# Patient Record
Sex: MALE | Race: WHITE | NOT HISPANIC OR LATINO | Employment: PART TIME | URBAN - METROPOLITAN AREA
[De-identification: names, ages, dates, MRNs, and addresses within clinical notes are randomized per-mention and may not be internally consistent; named-entity substitution may affect disease eponyms.]

---

## 2022-09-30 PROCEDURE — 99284 PR EMERGENCY DEPT VISIT,LEVEL IV: ICD-10-PCS | Mod: ,,, | Performed by: EMERGENCY MEDICINE

## 2022-09-30 PROCEDURE — 99284 EMERGENCY DEPT VISIT MOD MDM: CPT | Mod: ,,, | Performed by: EMERGENCY MEDICINE

## 2022-09-30 PROCEDURE — 99284 EMERGENCY DEPT VISIT MOD MDM: CPT

## 2022-10-01 ENCOUNTER — HOSPITAL ENCOUNTER (EMERGENCY)
Facility: HOSPITAL | Age: 42
Discharge: HOME | End: 2022-10-01
Attending: EMERGENCY MEDICINE
Payer: MEDICAID

## 2022-10-01 VITALS
RESPIRATION RATE: 18 BRPM | WEIGHT: 178 LBS | BODY MASS INDEX: 24.92 KG/M2 | TEMPERATURE: 99 F | SYSTOLIC BLOOD PRESSURE: 129 MMHG | DIASTOLIC BLOOD PRESSURE: 77 MMHG | HEIGHT: 71 IN | OXYGEN SATURATION: 99 % | HEART RATE: 76 BPM

## 2022-10-01 DIAGNOSIS — K08.89 PAIN, DENTAL: ICD-10-CM

## 2022-10-01 DIAGNOSIS — K04.7 DENTAL ABSCESS: Primary | ICD-10-CM

## 2022-10-01 PROCEDURE — 25000003 PHARM REV CODE 250: Performed by: EMERGENCY MEDICINE

## 2022-10-01 PROCEDURE — 96372 THER/PROPH/DIAG INJ SC/IM: CPT | Performed by: EMERGENCY MEDICINE

## 2022-10-01 PROCEDURE — 63600175 PHARM REV CODE 636 W HCPCS: Performed by: EMERGENCY MEDICINE

## 2022-10-01 RX ORDER — PENICILLIN V POTASSIUM 500 MG/1
500 TABLET, FILM COATED ORAL EVERY 6 HOURS
Qty: 28 TABLET | Refills: 0 | Status: ON HOLD | OUTPATIENT
Start: 2022-10-01 | End: 2022-11-05 | Stop reason: HOSPADM

## 2022-10-01 RX ORDER — AMOXICILLIN 500 MG/1
500 CAPSULE ORAL
Status: COMPLETED | OUTPATIENT
Start: 2022-10-01 | End: 2022-10-01

## 2022-10-01 RX ORDER — IBUPROFEN 200 MG
400 TABLET ORAL EVERY 6 HOURS PRN
Qty: 30 TABLET | Refills: 0 | Status: ON HOLD | OUTPATIENT
Start: 2022-10-01 | End: 2022-11-05 | Stop reason: HOSPADM

## 2022-10-01 RX ORDER — HYDROCODONE BITARTRATE AND ACETAMINOPHEN 5; 325 MG/1; MG/1
1 TABLET ORAL EVERY 6 HOURS PRN
Qty: 12 TABLET | Refills: 0 | Status: CANCELLED | OUTPATIENT
Start: 2022-10-01 | End: 2022-10-04

## 2022-10-01 RX ORDER — ACETAMINOPHEN 500 MG
1000 TABLET ORAL 3 TIMES DAILY PRN
Qty: 30 TABLET | Refills: 0
Start: 2022-10-01 | End: 2022-10-01 | Stop reason: SDUPTHER

## 2022-10-01 RX ORDER — IBUPROFEN 200 MG
400 TABLET ORAL EVERY 6 HOURS PRN
Qty: 30 TABLET | Refills: 0
Start: 2022-10-01 | End: 2022-10-01 | Stop reason: SDUPTHER

## 2022-10-01 RX ORDER — PENICILLIN V POTASSIUM 500 MG/1
500 TABLET, FILM COATED ORAL EVERY 6 HOURS
Qty: 28 TABLET | Refills: 0 | Status: CANCELLED | OUTPATIENT
Start: 2022-10-01

## 2022-10-01 RX ORDER — ACETAMINOPHEN 500 MG
1000 TABLET ORAL 3 TIMES DAILY PRN
Qty: 30 TABLET | Refills: 0 | Status: ON HOLD | OUTPATIENT
Start: 2022-10-01 | End: 2022-11-05 | Stop reason: SDUPTHER

## 2022-10-01 RX ORDER — KETOROLAC TROMETHAMINE 30 MG/ML
10 INJECTION, SOLUTION INTRAMUSCULAR; INTRAVENOUS
Status: COMPLETED | OUTPATIENT
Start: 2022-10-01 | End: 2022-10-01

## 2022-10-01 RX ORDER — ACETAMINOPHEN 500 MG
1000 TABLET ORAL 3 TIMES DAILY PRN
Qty: 30 TABLET | Refills: 0 | Status: CANCELLED
Start: 2022-10-01

## 2022-10-01 RX ORDER — HYDROCODONE BITARTRATE AND ACETAMINOPHEN 5; 325 MG/1; MG/1
1 TABLET ORAL EVERY 6 HOURS PRN
Qty: 12 TABLET | Refills: 0 | Status: SHIPPED | OUTPATIENT
Start: 2022-10-01 | End: 2022-10-04

## 2022-10-01 RX ORDER — IBUPROFEN 200 MG
400 TABLET ORAL EVERY 6 HOURS PRN
Qty: 30 TABLET | Refills: 0 | Status: CANCELLED
Start: 2022-10-01

## 2022-10-01 RX ADMIN — KETOROLAC TROMETHAMINE 10 MG: 30 INJECTION, SOLUTION INTRAMUSCULAR; INTRAVENOUS at 12:10

## 2022-10-01 RX ADMIN — AMOXICILLIN 500 MG: 500 CAPSULE ORAL at 12:10

## 2022-10-01 NOTE — DISCHARGE INSTRUCTIONS
DENTAL RESOURCES      [facility]       [phone]     [facility] 8-4 Monday - Friday       [phone]     [facility]romised [facility]       [phone]     [facility]                                 0-6 years                                                                                             7-13 years     [phone]     [facility]    [facility]   Developmental Disability [facility]     [phone]     [facility]       [phone]     [facility]    [address] [facility], Mon-Fri not on Wed  8am-4pm    Over 60 years old living in [place]           [phone]     [facility]    [address] [facility] [place]     [phone]     [facility] [address]       [phone]     [facility]  [address] [facility]       [phone]     [facility]       [phone]      [facility]     Ad·     4.5  (35) · Dentist     CLAY Sullivan     Closed ? Opens 8AM Wed · [phone]     WEBSITE     DIRECTIONS     [facility]     Ad·     4.1  (188) · Dentist     CLAY Foote     Open ? Closes 5:30PM · [phone]     WEBSITE     DIRECTIONS     [facility]     4.9  (889) · Dentist     [address]     Open ? Closes 5PM · [phone]     WEBSITE     DIRECTIONS     [facility]     5.0  (17) · Dentist     [address]     Open ? Closes 5PM · [phone]     WEBSITE     DIRECTIONS     [facility]     4.7  (61) · Dental clinic     [address]     Open ? Closes 5PM · [phone]     WEBSITE     DIRECTIONS     [facility]     4.8  (448) · Dental clinic     [address]     Closed ? Opens 8AM Wed · [phone]     WEBSITE     DIRECTIONS     [facility]      4.8  (171) · Dentist     [address]     Open ? Closes 6PM · [phone]     WEBSITE     DIRECTIONS     [facility]     4.9  (312) · Dentist     [address]     Open ? Closes 5PM · [phone]     Online care     WEBSITE     DIRECTIONS     [facility]     4.9  (249) · Dental clinic     [address]     Open ? Closes 5PM · [phone]     WEBSITE     DIRECTIONS     [facility]     4.9  (80) · Dentist     [place]     Open ? Closes 5PM · [phone]     WEBSITE     DIRECTIONS     [facility]     5.0  (88) · Dentist     [address]     Open ? Closes 8PM · [phone]     WEBSITE     DIRECTIONS     [facility]     4.6  (36) · Dentist     [address]     Closed ? Opens 8AM Mon · [phone]     WEBSITE     DIRECTIONS     [facility]     4.8  (107) · Dentist     [address]     Open ? Closes 4PM · [phone]     WEBSITE     DIRECTIONS     [facility]: Marquise Rutledge, DDS     5.0  (75) · Dentist     [address]     Open ? Closes 4PM · [phone]     WEBSITE     DIRECTIONS     [facility]     4.6  (41) · Dentist     [address]     Open ? Closes 5PM · [phone]     WEBSITE     DIRECTIONS     [facility]     4.9  (27) · Dentist     [address]     Open ? Closes 5PM · [phone]     WEBSITE     DIRECTIONS     [facility]     4.9  (368) · Dentist     [place], [place]     Open ? Closes 5PM · [phone]     WEBSITE     DIRECTIONS     [facility]     4.7  (74) · Dentist     [address]     Open ? Closes 5PM · [phone]     WEBSITE     DIRECTIONS     [facility]     4.9  (1,100) · Dental clinic     [address]     Open ? Closes 5PM · [phone]     WEBSITE     DIRECTIONS     [facility]     3.9  (14) · Dental clinic     [place], [place]     Open ? Closes  5PM · [phone]     WEBSITE     DIRECTIONS     Dr. Mounika Marrero DDS     No reviews · Dentist     [address]     Open ? Closes 5PM · [phone]     WEBSITE     DIRECTIONS     [facility]     4.6  (47) · Dentist     CLAY Sullivan     Temporarily closed · [phone]

## 2022-10-01 NOTE — ED NOTES
"Pt screaming and crying en route to intake 02. Pt sitting in exam room table rocking back and forth stating " My wisdom tooth is decaying in the back. It's sharp pains coming on. It's excruciating. I have never felt this type of pain." Pt reports pain to L side of mouth. Pt rates pain 10/10 on numerical pain scale. Airway patent. Pt denies difficulty swallowing/ shortness of breath or swelling to throat/ mouth.   "

## 2022-10-01 NOTE — ED PROVIDER NOTES
"Encounter Date: 9/30/2022       History     Chief Complaint   Patient presents with    Dental Pain     C/o L side wisdom tooth pain x 2 hr, reports that the tooth is decayed and needs to be pulled. Pt clutching face, bent over, moaning in triage, states "I need something for pain"     Pain started 3 hours ago  Took motrin but it didn't help  Has a known broken tooth- top left wisdom tooth  Does not have a dentist  No fevers, chills, mouth swelling, facial swelling    The history is provided by the patient.   Review of patient's allergies indicates:  No Known Allergies  No past medical history on file.  No past surgical history on file.  No family history on file.     Review of Systems  General: No fever.  No chills.  Eyes: No visual changes.  Head: No headache.    Integument: No rashes or lesions.  Chest: No shortness of breath.  Cardiovascular: No chest pain.  Abdomen: No abdominal pain.  No nausea or vomiting.  Urinary: No abnormal urination.  Neurologic: No focal weakness.  No numbness.  Hematologic: No easy bruising.  Endocrine: No excessive thirst or urination.    Physical Exam     Initial Vitals [09/30/22 2311]   BP Pulse Resp Temp SpO2   133/74 102 20 98.9 °F (37.2 °C) 100 %      MAP       --         Physical Exam    Nursing note and vitals reviewed.  Constitutional: He appears well-developed and well-nourished. He is not diaphoretic.   HENT:   Head: Normocephalic and atraumatic.   Left posterior wisdom tooth is broken  No drainable abscess   Eyes: Conjunctivae and EOM are normal.   Neck: Neck supple.   Normal range of motion.  Cardiovascular:  Normal rate and regular rhythm.           Pulmonary/Chest: No respiratory distress.   Abdominal: He exhibits no distension. There is no abdominal tenderness.   Musculoskeletal:      Cervical back: Normal range of motion and neck supple.     Neurological: He is alert and oriented to person, place, and time. GCS score is 15. GCS eye subscore is 4. GCS verbal subscore is " 5. GCS motor subscore is 6.   Skin: Skin is warm and dry.   Psychiatric: He has a normal mood and affect. His behavior is normal. Judgment and thought content normal.       ED Course   Procedures  Labs Reviewed - No data to display         Imaging Results    None          Medications   amoxicillin capsule 500 mg (500 mg Oral Given 10/1/22 0044)   ketorolac injection 9.999 mg (9.999 mg Intramuscular Given 10/1/22 0044)     Medical Decision Making:   Differential Diagnosis:   Dental fracture, abscess, caries  ED Management:  Started on pen VK for dental infection  No facial swelling or trismus  Given dental resources                        Clinical Impression:   Final diagnoses:  [K04.7] Dental abscess (Primary)  [K08.89] Pain, dental      ED Disposition Condition    Discharge Stable          ED Prescriptions       Medication Sig Dispense Start Date End Date Auth. Provider    penicillin v potassium (VEETID) 500 MG tablet Take 1 tablet (500 mg total) by mouth every 6 (six) hours. 28 tablet 10/1/2022 -- Tatiana Sandhu MD    ibuprofen (ADVIL,MOTRIN) 200 MG tablet  (Status: Discontinued) Take 2 tablets (400 mg total) by mouth every 6 (six) hours as needed for Pain. 30 tablet 10/1/2022 10/1/2022 Tatiana Sandhu MD    acetaminophen (TYLENOL) 500 MG tablet  (Status: Discontinued) Take 2 tablets (1,000 mg total) by mouth 3 (three) times daily as needed for Pain. 30 tablet 10/1/2022 10/1/2022 Tatiana Sandhu MD    HYDROcodone-acetaminophen (NORCO) 5-325 mg per tablet (Expires today) Take 1 tablet by mouth every 6 (six) hours as needed for Pain. 12 tablet 10/1/2022 10/4/2022 Tatiana Sandhu MD    acetaminophen (TYLENOL) 500 MG tablet Take 2 tablets (1,000 mg total) by mouth 3 (three) times daily as needed for Pain. 30 tablet 10/1/2022 -- Tatiana Sandhu MD    ibuprofen (ADVIL,MOTRIN) 200 MG tablet Take 2 tablets (400 mg total) by mouth every 6 (six) hours as needed for Pain. 30 tablet  10/1/2022 -- Tatiana Sandhu MD          Follow-up Information       Follow up With Specialties Details Why Contact Info    your dentist  In 1 day               Tatiana Sandhu MD  10/04/22 0477

## 2022-11-02 ENCOUNTER — HOSPITAL ENCOUNTER (OUTPATIENT)
Facility: HOSPITAL | Age: 42
Discharge: HOME | End: 2022-11-05
Attending: EMERGENCY MEDICINE | Admitting: HOSPITALIST
Payer: MEDICAID

## 2022-11-02 DIAGNOSIS — F19.90 IVDU (INTRAVENOUS DRUG USER): Chronic | ICD-10-CM

## 2022-11-02 DIAGNOSIS — F17.200 TOBACCO USE DISORDER: ICD-10-CM

## 2022-11-02 DIAGNOSIS — R07.9 CHEST PAIN: ICD-10-CM

## 2022-11-02 DIAGNOSIS — L03.115 CELLULITIS OF RIGHT ANKLE: Primary | ICD-10-CM

## 2022-11-02 DIAGNOSIS — M25.571 RIGHT ANKLE PAIN: ICD-10-CM

## 2022-11-02 DIAGNOSIS — F11.20 OPIOID USE DISORDER, SEVERE, DEPENDENCE: Chronic | ICD-10-CM

## 2022-11-02 DIAGNOSIS — M79.671 RIGHT FOOT PAIN: ICD-10-CM

## 2022-11-02 DIAGNOSIS — F14.20 COCAINE USE DISORDER, MODERATE, DEPENDENCE: Chronic | ICD-10-CM

## 2022-11-02 DIAGNOSIS — F19.10 IV DRUG ABUSE: ICD-10-CM

## 2022-11-02 PROCEDURE — 99285 PR EMERGENCY DEPT VISIT,LEVEL V: ICD-10-PCS | Mod: ,,, | Performed by: EMERGENCY MEDICINE

## 2022-11-02 PROCEDURE — 99285 EMERGENCY DEPT VISIT HI MDM: CPT | Mod: ,,, | Performed by: EMERGENCY MEDICINE

## 2022-11-03 PROBLEM — L03.115 CELLULITIS OF RIGHT ANKLE: Status: ACTIVE | Noted: 2022-11-03

## 2022-11-03 PROBLEM — F11.90 HEROIN USE: Status: ACTIVE | Noted: 2022-11-03

## 2022-11-03 PROBLEM — F14.20 COCAINE USE DISORDER, MODERATE, DEPENDENCE: Chronic | Status: ACTIVE | Noted: 2022-11-03

## 2022-11-03 PROBLEM — F10.10 ALCOHOL ABUSE: Status: ACTIVE | Noted: 2022-11-03

## 2022-11-03 PROBLEM — F17.200 TOBACCO USE DISORDER: Status: ACTIVE | Noted: 2022-11-03

## 2022-11-03 PROBLEM — R76.8 HEPATITIS C ANTIBODY POSITIVE IN BLOOD: Status: ACTIVE | Noted: 2022-11-03

## 2022-11-03 PROBLEM — F19.90 IVDU (INTRAVENOUS DRUG USER): Chronic | Status: ACTIVE | Noted: 2022-11-03

## 2022-11-03 PROBLEM — F19.10 POLYSUBSTANCE ABUSE: Status: ACTIVE | Noted: 2022-11-03

## 2022-11-03 PROBLEM — F11.20 OPIOID USE DISORDER, SEVERE, DEPENDENCE: Chronic | Status: ACTIVE | Noted: 2022-11-03

## 2022-11-03 PROBLEM — Z72.0 TOBACCO USE: Status: ACTIVE | Noted: 2022-11-03

## 2022-11-03 LAB
ALBUMIN SERPL BCP-MCNC: 3.8 G/DL (ref 3.5–5.2)
ALP SERPL-CCNC: 129 U/L (ref 55–135)
ALT SERPL W/O P-5'-P-CCNC: 23 U/L (ref 10–44)
ANION GAP SERPL CALC-SCNC: 13 MMOL/L (ref 8–16)
ASCENDING AORTA: 3.35 CM
AST SERPL-CCNC: 28 U/L (ref 10–40)
AV INDEX (PROSTH): 0.9
AV MEAN GRADIENT: 5 MMHG
AV PEAK GRADIENT: 8 MMHG
AV VALVE AREA: 3.61 CM2
AV VELOCITY RATIO: 0.94
BASOPHILS # BLD AUTO: 0.03 K/UL (ref 0–0.2)
BASOPHILS NFR BLD: 0.4 % (ref 0–1.9)
BILIRUB SERPL-MCNC: 0.4 MG/DL (ref 0.1–1)
BSA FOR ECHO PROCEDURE: 2.02 M2
BUN SERPL-MCNC: 7 MG/DL (ref 6–20)
CALCIUM SERPL-MCNC: 8.6 MG/DL (ref 8.7–10.5)
CHLORIDE SERPL-SCNC: 104 MMOL/L (ref 95–110)
CO2 SERPL-SCNC: 20 MMOL/L (ref 23–29)
CREAT SERPL-MCNC: 0.7 MG/DL (ref 0.5–1.4)
CRP SERPL-MCNC: 14 MG/L (ref 0–8.2)
CV ECHO LV RWT: 0.28 CM
DIFFERENTIAL METHOD: ABNORMAL
DOP CALC AO PEAK VEL: 1.39 M/S
DOP CALC AO VTI: 23.71 CM
DOP CALC LVOT AREA: 4 CM2
DOP CALC LVOT DIAMETER: 2.26 CM
DOP CALC LVOT PEAK VEL: 1.31 M/S
DOP CALC LVOT STROKE VOLUME: 85.48 CM3
DOP CALC MV VTI: 21.84 CM
DOP CALCLVOT PEAK VEL VTI: 21.32 CM
E WAVE DECELERATION TIME: 209.28 MSEC
E/A RATIO: 1.03
E/E' RATIO: 5 M/S
ECHO LV POSTERIOR WALL: 0.73 CM (ref 0.6–1.1)
EJECTION FRACTION: 65 %
EOSINOPHIL # BLD AUTO: 0.4 K/UL (ref 0–0.5)
EOSINOPHIL NFR BLD: 4.7 % (ref 0–8)
ERYTHROCYTE [DISTWIDTH] IN BLOOD BY AUTOMATED COUNT: 13.3 % (ref 11.5–14.5)
ERYTHROCYTE [SEDIMENTATION RATE] IN BLOOD BY PHOTOMETRIC METHOD: 16 MM/HR (ref 0–23)
EST. GFR  (NO RACE VARIABLE): >60 ML/MIN/1.73 M^2
FRACTIONAL SHORTENING: 30 % (ref 28–44)
GLUCOSE SERPL-MCNC: 75 MG/DL (ref 70–110)
HCT VFR BLD AUTO: 40.8 % (ref 40–54)
HCV AB SERPL QL IA: REACTIVE
HGB BLD-MCNC: 13.9 G/DL (ref 14–18)
HIV 1+2 AB+HIV1 P24 AG SERPL QL IA: NORMAL
IMM GRANULOCYTES # BLD AUTO: 0.02 K/UL (ref 0–0.04)
IMM GRANULOCYTES NFR BLD AUTO: 0.3 % (ref 0–0.5)
INTERVENTRICULAR SEPTUM: 0.66 CM (ref 0.6–1.1)
LA MAJOR: 4.65 CM
LA MINOR: 4.2 CM
LA WIDTH: 3.17 CM
LEFT ATRIUM SIZE: 3 CM
LEFT ATRIUM VOLUME INDEX MOD: 20.4 ML/M2
LEFT ATRIUM VOLUME INDEX: 17.7 ML/M2
LEFT ATRIUM VOLUME MOD: 41.3 CM3
LEFT ATRIUM VOLUME: 35.68 CM3
LEFT INTERNAL DIMENSION IN SYSTOLE: 3.58 CM (ref 2.1–4)
LEFT VENTRICLE DIASTOLIC VOLUME INDEX: 62.43 ML/M2
LEFT VENTRICLE DIASTOLIC VOLUME: 126.11 ML
LEFT VENTRICLE MASS INDEX: 59 G/M2
LEFT VENTRICLE SYSTOLIC VOLUME INDEX: 26.5 ML/M2
LEFT VENTRICLE SYSTOLIC VOLUME: 53.58 ML
LEFT VENTRICULAR INTERNAL DIMENSION IN DIASTOLE: 5.14 CM (ref 3.5–6)
LEFT VENTRICULAR MASS: 119.28 G
LV LATERAL E/E' RATIO: 4.64 M/S
LV SEPTAL E/E' RATIO: 5.42 M/S
LYMPHOCYTES # BLD AUTO: 2.2 K/UL (ref 1–4.8)
LYMPHOCYTES NFR BLD: 28.1 % (ref 18–48)
MCH RBC QN AUTO: 30.8 PG (ref 27–31)
MCHC RBC AUTO-ENTMCNC: 34.1 G/DL (ref 32–36)
MCV RBC AUTO: 90 FL (ref 82–98)
MONOCYTES # BLD AUTO: 0.7 K/UL (ref 0.3–1)
MONOCYTES NFR BLD: 8.6 % (ref 4–15)
MV A" WAVE DURATION": 11.13 MSEC
MV MEAN GRADIENT: 1 MMHG
MV PEAK A VEL: 0.63 M/S
MV PEAK E VEL: 0.65 M/S
MV PEAK GRADIENT: 3 MMHG
MV STENOSIS PRESSURE HALF TIME: 60.69 MS
MV VALVE AREA BY CONTINUITY EQUATION: 3.91 CM2
MV VALVE AREA P 1/2 METHOD: 3.62 CM2
NEUTROPHILS # BLD AUTO: 4.4 K/UL (ref 1.8–7.7)
NEUTROPHILS NFR BLD: 57.9 % (ref 38–73)
NRBC BLD-RTO: 0 /100 WBC
PISA TR MAX VEL: 2.26 M/S
PLATELET # BLD AUTO: 222 K/UL (ref 150–450)
PMV BLD AUTO: 9.7 FL (ref 9.2–12.9)
POCT GLUCOSE: 113 MG/DL (ref 70–110)
POCT GLUCOSE: 89 MG/DL (ref 70–110)
POTASSIUM SERPL-SCNC: 3.6 MMOL/L (ref 3.5–5.1)
PROT SERPL-MCNC: 7.4 G/DL (ref 6–8.4)
PULM VEIN S/D RATIO: 1.34
PV PEAK D VEL: 0.41 M/S
PV PEAK S VEL: 0.55 M/S
RA MAJOR: 4.08 CM
RA PRESSURE: 3 MMHG
RA WIDTH: 2.63 CM
RBC # BLD AUTO: 4.52 M/UL (ref 4.6–6.2)
RIGHT VENTRICULAR END-DIASTOLIC DIMENSION: 3.05 CM
RV TISSUE DOPPLER FREE WALL SYSTOLIC VELOCITY 1 (APICAL 4 CHAMBER VIEW): 19.74 CM/S
SINUS: 3.17 CM
SODIUM SERPL-SCNC: 137 MMOL/L (ref 136–145)
STJ: 3.02 CM
TDI LATERAL: 0.14 M/S
TDI SEPTAL: 0.12 M/S
TDI: 0.13 M/S
TR MAX PG: 20 MMHG
TRICUSPID ANNULAR PLANE SYSTOLIC EXCURSION: 2.14 CM
TV REST PULMONARY ARTERY PRESSURE: 23 MMHG
WBC # BLD AUTO: 7.66 K/UL (ref 3.9–12.7)

## 2022-11-03 PROCEDURE — 85025 COMPLETE CBC W/AUTO DIFF WBC: CPT | Performed by: EMERGENCY MEDICINE

## 2022-11-03 PROCEDURE — 63600175 PHARM REV CODE 636 W HCPCS

## 2022-11-03 PROCEDURE — 99220 PR INITIAL OBSERVATION CARE,LEVL III: ICD-10-PCS | Mod: ,,,

## 2022-11-03 PROCEDURE — 25000003 PHARM REV CODE 250: Performed by: EMERGENCY MEDICINE

## 2022-11-03 PROCEDURE — S4991 NICOTINE PATCH NONLEGEND: HCPCS

## 2022-11-03 PROCEDURE — 87522 HEPATITIS C REVRS TRNSCRPJ: CPT | Performed by: EMERGENCY MEDICINE

## 2022-11-03 PROCEDURE — 99219 PR INITIAL OBSERVATION CARE,LEVL II: CPT | Mod: AF,HB,, | Performed by: PSYCHIATRY & NEUROLOGY

## 2022-11-03 PROCEDURE — 63600175 PHARM REV CODE 636 W HCPCS: Performed by: HOSPITALIST

## 2022-11-03 PROCEDURE — 96365 THER/PROPH/DIAG IV INF INIT: CPT

## 2022-11-03 PROCEDURE — 86803 HEPATITIS C AB TEST: CPT | Performed by: EMERGENCY MEDICINE

## 2022-11-03 PROCEDURE — 63600175 PHARM REV CODE 636 W HCPCS: Performed by: EMERGENCY MEDICINE

## 2022-11-03 PROCEDURE — 87522 HEPATITIS C REVRS TRNSCRPJ: CPT | Mod: 91

## 2022-11-03 PROCEDURE — 20605 DRAIN/INJ JOINT/BURSA W/O US: CPT | Mod: RT

## 2022-11-03 PROCEDURE — 80053 COMPREHEN METABOLIC PANEL: CPT | Performed by: EMERGENCY MEDICINE

## 2022-11-03 PROCEDURE — 85652 RBC SED RATE AUTOMATED: CPT | Performed by: EMERGENCY MEDICINE

## 2022-11-03 PROCEDURE — 99285 EMERGENCY DEPT VISIT HI MDM: CPT | Mod: 25

## 2022-11-03 PROCEDURE — 36415 COLL VENOUS BLD VENIPUNCTURE: CPT | Performed by: HOSPITALIST

## 2022-11-03 PROCEDURE — 99219 PR INITIAL OBSERVATION CARE,LEVL II: ICD-10-PCS | Mod: AF,HB,, | Performed by: PSYCHIATRY & NEUROLOGY

## 2022-11-03 PROCEDURE — 86140 C-REACTIVE PROTEIN: CPT | Performed by: EMERGENCY MEDICINE

## 2022-11-03 PROCEDURE — 25000003 PHARM REV CODE 250

## 2022-11-03 PROCEDURE — 96372 THER/PROPH/DIAG INJ SC/IM: CPT

## 2022-11-03 PROCEDURE — 96366 THER/PROPH/DIAG IV INF ADDON: CPT | Mod: 59

## 2022-11-03 PROCEDURE — 87389 HIV-1 AG W/HIV-1&-2 AB AG IA: CPT | Performed by: EMERGENCY MEDICINE

## 2022-11-03 PROCEDURE — G0378 HOSPITAL OBSERVATION PER HR: HCPCS

## 2022-11-03 PROCEDURE — 87040 BLOOD CULTURE FOR BACTERIA: CPT | Mod: 59 | Performed by: HOSPITALIST

## 2022-11-03 PROCEDURE — 99220 PR INITIAL OBSERVATION CARE,LEVL III: CPT | Mod: ,,,

## 2022-11-03 PROCEDURE — 96375 TX/PRO/DX INJ NEW DRUG ADDON: CPT | Mod: 59

## 2022-11-03 PROCEDURE — 25000003 PHARM REV CODE 250: Performed by: HOSPITALIST

## 2022-11-03 PROCEDURE — 25500020 PHARM REV CODE 255: Performed by: EMERGENCY MEDICINE

## 2022-11-03 PROCEDURE — 96367 TX/PROPH/DG ADDL SEQ IV INF: CPT | Mod: 59

## 2022-11-03 PROCEDURE — A9585 GADOBUTROL INJECTION: HCPCS | Performed by: EMERGENCY MEDICINE

## 2022-11-03 RX ORDER — SIMETHICONE 80 MG
1 TABLET,CHEWABLE ORAL 4 TIMES DAILY PRN
Status: DISCONTINUED | OUTPATIENT
Start: 2022-11-03 | End: 2022-11-05 | Stop reason: HOSPADM

## 2022-11-03 RX ORDER — DIPHENHYDRAMINE HYDROCHLORIDE 50 MG/ML
INJECTION INTRAMUSCULAR; INTRAVENOUS
Status: COMPLETED
Start: 2022-11-03 | End: 2022-11-03

## 2022-11-03 RX ORDER — IBUPROFEN 200 MG
24 TABLET ORAL
Status: DISCONTINUED | OUTPATIENT
Start: 2022-11-03 | End: 2022-11-05 | Stop reason: HOSPADM

## 2022-11-03 RX ORDER — FOLIC ACID 1 MG/1
1 TABLET ORAL DAILY
Status: DISCONTINUED | OUTPATIENT
Start: 2022-11-03 | End: 2022-11-05 | Stop reason: HOSPADM

## 2022-11-03 RX ORDER — BISACODYL 10 MG
10 SUPPOSITORY, RECTAL RECTAL DAILY PRN
Status: DISCONTINUED | OUTPATIENT
Start: 2022-11-03 | End: 2022-11-05 | Stop reason: HOSPADM

## 2022-11-03 RX ORDER — ACETAMINOPHEN 325 MG/1
650 TABLET ORAL EVERY 4 HOURS PRN
Status: DISCONTINUED | OUTPATIENT
Start: 2022-11-03 | End: 2022-11-05 | Stop reason: HOSPADM

## 2022-11-03 RX ORDER — HYDROXYZINE PAMOATE 50 MG/1
50 CAPSULE ORAL EVERY 8 HOURS PRN
Status: DISCONTINUED | OUTPATIENT
Start: 2022-11-03 | End: 2022-11-05 | Stop reason: HOSPADM

## 2022-11-03 RX ORDER — IBUPROFEN 200 MG
16 TABLET ORAL
Status: DISCONTINUED | OUTPATIENT
Start: 2022-11-03 | End: 2022-11-05 | Stop reason: HOSPADM

## 2022-11-03 RX ORDER — METHOCARBAMOL 500 MG/1
500 TABLET, FILM COATED ORAL EVERY 6 HOURS PRN
Status: DISCONTINUED | OUTPATIENT
Start: 2022-11-03 | End: 2022-11-05 | Stop reason: HOSPADM

## 2022-11-03 RX ORDER — CLINDAMYCIN PHOSPHATE 600 MG/50ML
600 INJECTION, SOLUTION INTRAVENOUS
Status: COMPLETED | OUTPATIENT
Start: 2022-11-03 | End: 2022-11-03

## 2022-11-03 RX ORDER — TALC
6 POWDER (GRAM) TOPICAL NIGHTLY PRN
Status: DISCONTINUED | OUTPATIENT
Start: 2022-11-03 | End: 2022-11-05 | Stop reason: HOSPADM

## 2022-11-03 RX ORDER — LOPERAMIDE HYDROCHLORIDE 2 MG/1
2 CAPSULE ORAL 4 TIMES DAILY PRN
Status: DISCONTINUED | OUTPATIENT
Start: 2022-11-03 | End: 2022-11-05 | Stop reason: HOSPADM

## 2022-11-03 RX ORDER — GADOBUTROL 604.72 MG/ML
9 INJECTION INTRAVENOUS
Status: COMPLETED | OUTPATIENT
Start: 2022-11-03 | End: 2022-11-03

## 2022-11-03 RX ORDER — DICYCLOMINE HYDROCHLORIDE 20 MG/1
20 TABLET ORAL EVERY 6 HOURS PRN
Status: DISCONTINUED | OUTPATIENT
Start: 2022-11-03 | End: 2022-11-05 | Stop reason: HOSPADM

## 2022-11-03 RX ORDER — CLINDAMYCIN PHOSPHATE 300 MG/50ML
600 INJECTION INTRAVENOUS
Status: DISCONTINUED | OUTPATIENT
Start: 2022-11-03 | End: 2022-11-03

## 2022-11-03 RX ORDER — MORPHINE SULFATE 4 MG/ML
4 INJECTION, SOLUTION INTRAMUSCULAR; INTRAVENOUS
Status: COMPLETED | OUTPATIENT
Start: 2022-11-03 | End: 2022-11-03

## 2022-11-03 RX ORDER — PROCHLORPERAZINE EDISYLATE 5 MG/ML
5 INJECTION INTRAMUSCULAR; INTRAVENOUS EVERY 6 HOURS PRN
Status: DISCONTINUED | OUTPATIENT
Start: 2022-11-03 | End: 2022-11-05 | Stop reason: HOSPADM

## 2022-11-03 RX ORDER — IPRATROPIUM BROMIDE AND ALBUTEROL SULFATE 2.5; .5 MG/3ML; MG/3ML
3 SOLUTION RESPIRATORY (INHALATION) EVERY 4 HOURS PRN
Status: DISCONTINUED | OUTPATIENT
Start: 2022-11-03 | End: 2022-11-05 | Stop reason: HOSPADM

## 2022-11-03 RX ORDER — NALOXONE HCL 0.4 MG/ML
0.02 VIAL (ML) INJECTION
Status: DISCONTINUED | OUTPATIENT
Start: 2022-11-03 | End: 2022-11-05 | Stop reason: HOSPADM

## 2022-11-03 RX ORDER — IBUPROFEN 400 MG/1
400 TABLET ORAL EVERY 6 HOURS PRN
Status: DISCONTINUED | OUTPATIENT
Start: 2022-11-03 | End: 2022-11-05 | Stop reason: HOSPADM

## 2022-11-03 RX ORDER — ENOXAPARIN SODIUM 100 MG/ML
40 INJECTION SUBCUTANEOUS EVERY 24 HOURS
Status: DISCONTINUED | OUTPATIENT
Start: 2022-11-03 | End: 2022-11-05 | Stop reason: HOSPADM

## 2022-11-03 RX ORDER — THIAMINE HCL 100 MG
100 TABLET ORAL DAILY
Status: DISCONTINUED | OUTPATIENT
Start: 2022-11-03 | End: 2022-11-05 | Stop reason: HOSPADM

## 2022-11-03 RX ORDER — GLUCAGON 1 MG
1 KIT INJECTION
Status: DISCONTINUED | OUTPATIENT
Start: 2022-11-03 | End: 2022-11-05 | Stop reason: HOSPADM

## 2022-11-03 RX ORDER — ONDANSETRON 8 MG/1
8 TABLET, ORALLY DISINTEGRATING ORAL EVERY 8 HOURS PRN
Status: DISCONTINUED | OUTPATIENT
Start: 2022-11-03 | End: 2022-11-05 | Stop reason: HOSPADM

## 2022-11-03 RX ORDER — SODIUM CHLORIDE 0.9 % (FLUSH) 0.9 %
5 SYRINGE (ML) INJECTION
Status: DISCONTINUED | OUTPATIENT
Start: 2022-11-03 | End: 2022-11-05 | Stop reason: HOSPADM

## 2022-11-03 RX ORDER — POLYETHYLENE GLYCOL 3350 17 G/17G
17 POWDER, FOR SOLUTION ORAL DAILY
Status: DISCONTINUED | OUTPATIENT
Start: 2022-11-03 | End: 2022-11-05 | Stop reason: HOSPADM

## 2022-11-03 RX ORDER — MAG HYDROX/ALUMINUM HYD/SIMETH 200-200-20
30 SUSPENSION, ORAL (FINAL DOSE FORM) ORAL 4 TIMES DAILY PRN
Status: DISCONTINUED | OUTPATIENT
Start: 2022-11-03 | End: 2022-11-05 | Stop reason: HOSPADM

## 2022-11-03 RX ORDER — DIPHENHYDRAMINE HYDROCHLORIDE 50 MG/ML
25 INJECTION INTRAMUSCULAR; INTRAVENOUS
Status: COMPLETED | OUTPATIENT
Start: 2022-11-03 | End: 2022-11-03

## 2022-11-03 RX ORDER — ACETAMINOPHEN 500 MG
1000 TABLET ORAL EVERY 8 HOURS PRN
Status: DISCONTINUED | OUTPATIENT
Start: 2022-11-03 | End: 2022-11-05 | Stop reason: HOSPADM

## 2022-11-03 RX ORDER — IBUPROFEN 200 MG
1 TABLET ORAL DAILY
Status: DISCONTINUED | OUTPATIENT
Start: 2022-11-03 | End: 2022-11-05 | Stop reason: HOSPADM

## 2022-11-03 RX ADMIN — Medication 100 MG: at 02:11

## 2022-11-03 RX ADMIN — VANCOMYCIN HYDROCHLORIDE 1750 MG: 500 INJECTION, POWDER, LYOPHILIZED, FOR SOLUTION INTRAVENOUS at 12:11

## 2022-11-03 RX ADMIN — POLYETHYLENE GLYCOL 3350 17 G: 17 POWDER, FOR SOLUTION ORAL at 12:11

## 2022-11-03 RX ADMIN — MORPHINE SULFATE 4 MG: 4 INJECTION INTRAVENOUS at 02:11

## 2022-11-03 RX ADMIN — DIPHENHYDRAMINE HYDROCHLORIDE 25 MG: 50 INJECTION, SOLUTION INTRAMUSCULAR; INTRAVENOUS at 02:11

## 2022-11-03 RX ADMIN — GADOBUTROL 9 ML: 604.72 INJECTION INTRAVENOUS at 03:11

## 2022-11-03 RX ADMIN — ACETAMINOPHEN 1000 MG: 500 TABLET ORAL at 02:11

## 2022-11-03 RX ADMIN — VANCOMYCIN HYDROCHLORIDE 1500 MG: 1.5 INJECTION, POWDER, LYOPHILIZED, FOR SOLUTION INTRAVENOUS at 09:11

## 2022-11-03 RX ADMIN — NICOTINE 1 PATCH: 14 PATCH, EXTENDED RELEASE TRANSDERMAL at 12:11

## 2022-11-03 RX ADMIN — CLINDAMYCIN IN 5 PERCENT DEXTROSE 600 MG: 12 INJECTION, SOLUTION INTRAVENOUS at 04:11

## 2022-11-03 RX ADMIN — DIPHENHYDRAMINE HYDROCHLORIDE 25 MG: 50 INJECTION INTRAMUSCULAR; INTRAVENOUS at 02:11

## 2022-11-03 RX ADMIN — CEFTRIAXONE 2 G: 2 INJECTION, SOLUTION INTRAVENOUS at 02:11

## 2022-11-03 NOTE — CONSULTS
Orthopedic Surgery  Consult Note    Tye Florian III  11/03/2022    CC: right ankle pain    HPI: Tye Florian III is a 42 y.o. male IV drug user who presents with right ankle pain after injecting IV drugs into right ankle region. Patients states he was trying to inject heroin into his right ankle and thought he put it in a vein. For the past 1 day he been having pain in that ankle that has been getting worse with some associated redness and swelling and pain with weight bearing. When asked to point to the pain he points to his posterolateral ankle. Denies prior injuries or surgeries or infections to the ankle. Denies other MSK pains or paresthesias. Denies fevers or chills.      History reviewed. No pertinent past medical history.  No past surgical history on file.  No family history on file.  Social History     Socioeconomic History    Marital status: Single   Tobacco Use    Smoking status: Every Day     Types: Cigarettes    Smokeless tobacco: Never   Substance and Sexual Activity    Alcohol use: Yes    Drug use: Yes     Types: IV     No current facility-administered medications on file prior to encounter.     Current Outpatient Medications on File Prior to Encounter   Medication Sig    acetaminophen (TYLENOL) 500 MG tablet Take 2 tablets (1,000 mg total) by mouth 3 (three) times daily as needed for Pain.    ibuprofen (ADVIL,MOTRIN) 200 MG tablet Take 2 tablets (400 mg total) by mouth every 6 (six) hours as needed for Pain.    penicillin v potassium (VEETID) 500 MG tablet Take 1 tablet (500 mg total) by mouth every 6 (six) hours.         Review of Systems:  Constitutional: negative for fevers  Eyes: negative visual changes  ENT: negative for hearing loss  Respiratory: negative for dyspnea  Cardiovascular: negative for chest pain  Gastrointestinal: negative for abdominal pain  Genitourinary: negative for dysuria  Neurological: negative for headaches  Behavioral/Psych: negative for  "hallucinations  Endocrine: negative for temperature intolerance      Physical Exam:    Temp:  [98.8 °F (37.1 °C)] 98.8 °F (37.1 °C)  Pulse:  [91] 91  Resp:  [15-16] 15  SpO2:  [95 %] 95 %  BP: (125)/(68) 125/68    Vitals: Afebrile.  Vital signs stable.  General: No acute distress.  HEENT: Normocephalic. Atraumatic. Sclera anicteric. No tracheal deviation.  Cardio: Regular rate.  Chest: No increased work of breathing.  Abdominal: Nondistended.  Extremities: No cyanosis.  No clubbing.    Skin: No generalized rash.  Neuro: Awake. Alert. Oriented to person, place, time, and situation.  Psych: Normal appearance. Cooperative.  Appropriate mood.  Appropriate affect.      MSK:  Left upper extremity  Inspection: no swelling, lacerations, abrasions, contusions, or deformity  Palpation: no TTP, no palpable abnormality, compartments soft and compressible  ROM: full, painless passive and active ROM of shoulder, elbow, wrist, and fingers  Neuro: 5/5 flexion/extension of shoulder, elbow, wrist. Strong hand . Able to give thumbs up, make "OK" sign, cross IF/LF, abduct/adduct fingers, make fist. SILT axillary, radial, median, ulnar.  Vascular: 2+ radial pulse, fingers WWP     Right upper extremity  Inspection: abrasions over 2nd/3rd dorsal MCP joitns with no signs of infection. Palpation: no TTP, no palpable abnormality, compartments soft and compressible  ROM: full, painless passive and active Mccabe f shoulder, elbow, wrist, and fingers  Neuro: 5/5 flexion/extension of shoulder, elbow, wrist. Strong hand . Able to give thumbs up, make "OK" sign, cross IF/LF, abduct/adduct fingers, make fist. SILT axillary, radial, median, ulnar.  Vascular: 2+ radial pulse, fingers WWP     Left lower extremity  Inspection: no swelling, lacerations, abrasions, contusions, or deformity  Palpation: no TTP, no palpable abnormality, compartments soft and compressible  ROM: full, painless passive and active ROM of hip, knee, ankle, toes  Neuro: 5/5 " flexion/extension of hip, knee, ankle, hallux. SILT throughout.   Vascular: 2+ DP pulse, toes WWP     Right lower extremity  Inspection: mild swelling right ankle, no lacerations, abrasions, contusions, or deformity  Palpation: focal TTP right posterolateral ankle, no global joint line tenderness or tenderness medially. compartments soft and compressible  ROM: ankle ROM causes pain in posterolateral region of ankle. full passive and active ROM of hip, knee, ankle, toes  Neuro: 5/5 flexion/extension of hip, knee, ankle, hallux. SILT throughout.   Vascular: 2+ DP pulse, toes WWP      Labs:   WBC pending  CRP 14  ESR 16    Diagnostic Results: Radiographs independently reviewed and interpreted:   Xrays of the right ankle show no fracture, dislocation or effusion  Xrays of right foot show no fracture, dislocation or effusion    Assessment/Plan:  Tye Florian III is a 42 y.o. male IV drug user with right posterolateral ankle pain, redness and swelling x 1d after injecting heroin; orthopedics consulted to r/o right ankle septic arthritis. Patient afebrile, CRP mildly elevated 14, ESR wnl 16. Clinical picture more concerning for possible abscess posterolateral ankle. Out of abundance of precaution aspirated right ankle to r/o septic arthritis. Aspiration done anteromedially at region of no cellulitis using fluroscopy to confirm accuracy; no joint fluid obtained to send for WBC or culture.      Recs  - MRI R ankle w & wo contrast to eval for abscess  - Further recs pending results of MRI        Procedure Note: right ankle joint aspiration  Patient was explained risks, benefits, and alternatives to treatment and verbalized consent to proceed. Following confirmation of the  patient name, site, and procedure, we began. Skin was sterilely prepared with betadine and then alcohol solution. A 18 gauge needle on a 50 cc syringe was used for aspiration through anteromedial approach. Fluroscopy utilized to confirm accuracyTwo  attempts made. . 0 cc of fluid collected. Aspiration site was covered with a bandaid. The patient tolerated the procedure quite well.       Ramos Banks MD  Orthopedic Surgery Resident  11/03/2022

## 2022-11-03 NOTE — PLAN OF CARE
[facility] ([place] [address])  Initial Discharge Assessment       Primary Care Provider: Primary Doctor No    Admission Diagnosis: Right ankle pain [M25.571]  Right foot pain [M79.671]  Chest pain [R07.9]  Cellulitis of right ankle [L03.115]    Admission Date: 11/2/2022  Expected Discharge Date: 11/5/2022         Payor: MEDICAID / Plan: [place] / Product Type: Managed Medicaid /     Extended Emergency Contact Information  Primary Emergency Contact: CornelioFacundo leonardi  Mobile Phone: [phone]  Relation: Mother    Discharge Plan A: (P) Home  Discharge Plan B: (P) Home    No Pharmacies Listed    Initial Assessment (most recent)       Adult Discharge Assessment - 11/03/22 1353          Discharge Assessment    Assessment Type Discharge Planning Assessment (P)      Confirmed/corrected address, phone number and insurance Yes (P)      Confirmed Demographics Correct on Facesheet (P)      Source of Information patient (P)      When was your last doctors appointment? -- (P)    unknown    Communicated MOMO with patient/caregiver Date not available/Unable to determine (P)      Reason For Admission See admit diagnosis (P)      Lives With significant other (P)      Do you expect to return to your current living situation? Yes (P)      Do you have help at home or someone to help you manage your care at home? Yes (P)      Who are your caregiver(s) and their phone number(s)? Bailey Porter [phone] - mother (P)      Prior to hospitilization cognitive status: Alert/Oriented (P)      Current cognitive status: Alert/Oriented (P)      Walking or Climbing Stairs Difficulty none (P)      Dressing/Bathing Difficulty none (P)      Equipment Currently Used at Home none (P)      Readmission within 30 days? No (P)      Patient currently being followed by outpatient case management? No (P)      Do you currently have service(s) that help you manage your care at home? No (P)      Do you take prescription  medications? No (P)      Do you have prescription coverage? Yes (P)      Coverage Medicaid (P)      Do you have any problems affording any of your prescribed medications? TBD (P)      Who is going to help you get home at discharge? Pt stated that he will walk home as he lives nearby. (P)      How do you get to doctors appointments? car, drives self;family or friend will provide (P)      Are you on dialysis? No (P)      Do you take coumadin? No (P)      Discharge Plan A Home (P)      Discharge Plan B Home (P)         Relationship/Environment    Name(s) of Who Lives With Patient Pt reports that he lives with his girlfriend. (P)                            met with patient to discuss SW role and to assess for needs at discharge.  Pt agreed to SW interview.  Verified contact information, address, and insurance from facesheet.   Patient reported living with his girlfriend in an apartment.  Prior to hospital admission, patient was independent with ADL's, and did not use assistive devices for mobility. He also stated that he does not attend a coumadin clinic and is not on dialysis.  Pt reports that he will walk home as he lives nearby.  Pt did not have a primary contact. SW added his mother as his primary contact per his request.  Pt does not have a PCP.  He is not on any prescription medications. If he needs RX, he is agreeable [facility].    All questions addressed.  Case management will continue to follow and assist with discharge needs as needed.    NICOLA Pederson   [facility]  11/03/2022

## 2022-11-03 NOTE — MEDICAL/APP STUDENT
"      [facility]  DEPARTMENT OF PSYCHIATRY    ADDICTION CONSULT INITIAL EVALUATION     SITE: [facility], [address]    11/3/2022 8:24 AM  Tye Florian III  1980  [id number]    DATE OF ADMISSION: 11/2/2022 11:01 PM  LENGTH OF STAY: 0 days    EXAMINING PRACTITIONER: Deanna Mccrary    Consults    DELGADO:     I[]I Y = II[x][]II = Yes / Present / Present Though Denies / Endorses  I[]I N = II[][x]II = No / Absent / Absent Though Endorses / Denies  I[]I U = II[][]II = Unknown / Unable to Assess/Enact / Unwilling to Participate/Provide  I[]I A = II[x][x]II = Ambiguity / Uncertainty of Accuracy Exists  I[]I D = Denial or Minimization is Suspected/Evident  I[]I N/A = Non-Applicable    CHIEF COMPLAINT:     Patient Name: Tye Florian III  YOB: 1980  MRN: [id number]    Tye Florian III is a 42 y.o. male who is being seen by me for an initial psychiatric evaluation.  Tye Florian III presents with the chief complaint of: problematic substance use/abuse    CHART REVIEW:     Available documentation has been reviewed, and pertinent elements of the chart have been incorporated into this evaluation where appropriate.    Per Primary Team:    Tye Florian III is a 43 yo male IV drug user who presents with right ankle pain after injecting IV drugs into right ankle region. The majority of the history is collected from the ED and ortho notes as the patient refused to participate in an interview with me. Per ortho note "Patients states he was trying to inject heroin into his right ankle and thought he put it in a vein. For the past 1 day he been having pain in that ankle that has been getting worse with some associated redness and swelling and pain with weight bearing. When asked to point to the pain he points to his posterolateral ankle. Denies prior injuries or surgeries or infections to the ankle. Denies other MSK pains or paresthesias. Denies fevers or chills." When " "asked about most recent heroin use and frequency patient states "never" and sleeps through the rest of the exam despite being arousable to verbal stimuli. Denies pain currently.      In the ED, AFVSS. WBC 7.6. Sed rate wnl 16. CRP elevated 14.  Bicarb 20, Cr 0.7. Hepatitis V ab reactive. Ortho consulted and attempted right ankle joint aspiration no joint fluid obtained. Imaging R ankle/foot without fracture. MRI R ankle without evidence of soft tissue abscess, osteomyelitis, or septic arthritis. Subcutaneous soft tissue edema about the ankle, most pronounced laterally. S/p clindamycin 600 mg IV, benadryl 25 mg IV, morphine 4 mg IV. Admitted to  for IV abx.    The patient's last encounter in the psychiatry department was on: Visit date not found    PRESENTATION:     HISTORY OF PRESENT ILLNESS:             .    Tye Florian III is a 42yoM admitted for treatment of cellulitis after injecting heroin in his R ankle. Psychiatry consulted for substance abuse evaluation and rehab resources. Uses IVDU off and on for about 15 yrs. Drugs of choice are IV heroin, cocaine, and tobacco cigarettes. He uses up to 1g of IV heroin daily, about $20 worth of cocaine once a week, and smokes about 1 pack of cigarettes daily. Longest period of sobriety was for 7 yrs while he was in halfway. Has been sober outside of halfway for an unknown period of time. Has attended AA in the past but did not feel it helped. Denies ever doing rehab, but is interested in attending. He is ambivalent about quitting, stating that he "should try to quit, and should go to rehab." Pt became more drowsy and started falling asleep during later portion of interview.     COWS score 2  Positive for mild tremor. Denies restlessness, GI upset, n/v, sweats, chills, rhinorrhea or lacrimation.           .  COLLATERAL:     Patient information was obtained from patient.    Also present with the patient at the time of the evaluation: patient evaluated alone.    No " "additional collateral obtained from outside sources.                       .  ADDICTION:     SUBSTANCE USE:     I[]I Patient denies any substance use history, and none is known.  I[]I Patient unable or unwilling to provide any substance use history.    I[x]I Y  I[]I N  I[]I U  I[x]I Current  I[]I Former  Nicotine Use: 1 pack/day  I[x]I Y  I[]I N  I[]I U  I[x]I Current  I[]I Former  Alcohol Misuse/Abuse: about 5 times a month, 2-3 beers at a time  I[x]I Y  I[]I N  I[]I U  I[x]I Current  I[]I Former  Illicit Drug Use/Misuse/Abuse: up to 1g of daily IV heroin use, $20 worth of cocaine once a week   I[x]I Y  I[]I N  I[]I U  I[]I Current  I[x]I Former  Misuse/Abuse of Rx Medications: Xanax in the past, does not currently use    I[]I Y  I[x]I N  I[]I U  Hx of Detox:   I[]I Y  I[x]I N  I[]I U  Hx of Rehab:     Current Alcohol Use:   I[]I U    I[]I N    I[]I Rare    I[x]I Social    I[]I Exceeds Recommended Health Limits    I[]I Binge Pattern    I[]I Daily or Near Daily    I[]I Physiologically Dependent    I[x]I N/A  I[]I U  Average Consumption (e.g. type, quantity, frequency):   I[x]I N/A  I[]I U  Last drink:     Substances Used (Lifetime):   I[]I U    I[]I N    II[][]II Cannabis    II[x][]II Cocaine    II[x][]II Heroin   II[][]II Opioids    II[][]II Methamphetamine    II[][]II Stimulants    II[x][]II Benzodiazepines    I[]I Other:     Tobacco Cessation ("Wants to Quit"):   I[]I N/A  I[x]I U  II[][]II Interested in Quitting    II[][]II Uninterested in Quitting   II[][]II Making Efforts to Cut Back or Quit    II[][]II Advised to Quit    II[][]II Assistance Provided    II[][]II Encouragement Provided    II[][]II Motivational Interviewing Provided    II[][]II Resources Provided    II[][]II Referral Made     I[]I N/A  I[x]I Y  I[]I N  I[]I U  Meets Criteria for Substance Use Disorder:   I[]I N/A  I[x]I Y  I[]I N  I[]I U  Advised to Quit/Cut Back:   I[]I N/A  I[x]I Y  I[x]I N  I[]I U  Motivated to Do So: Somewhat motivated, " "knows that he should    ADDITIONAL FACTORS RELATED TO ADDICTION:     I[x]I Y  I[]I N  I[]I U  Hx of IVDU:   I[]I Y  I[]I N  I[x]I U  Hx of Accidental Overdose:   I[]I Y  I[]I N  I[x]I U  Hx of DUI:   I[]I Y  I[x]I N  I[]I U  Hx of Complicated Withdrawal (i.e. Seizures and/or Delirium Tremens):   I[]I Y  I[]I N  I[x]I U  Hx of Known/Suspected Substance-Induced Psychiatric Disorder:   I[]I Y  I[]I N  I[x]I U  Hx of Medication Assisted Treatment:   I[x]I Y  I[]I N  I[]I U  Hx of Twelve Step Program (or Equivalent) Involvement: AA, but did not feel it helped  I[]I Y  I[x]I N  I[]I U  Currently Exhibits Signs of Intoxication:   I[x]I Y  I[]I N  I[]I U  Currently Exhibits Signs of Withdrawal:   I[]I Y  I[x]I N  I[]I U  Currently Active in Recovery:     Substance(s) of Choice: IV heroin, cocaine, tobacco  Substances Used Currently/Recently: IV heroin, cocaine, tobacco, and alcohol  Duration of Sobriety/Abstinence: during senior living  Date of Last Use of Substances: last used heroin and alcohol yesterday  View/Acceptance of Twelve Step (or Equivalent) Programs: Does not feel it helps  Social Support: no  Spouse/Partner Consumption: n/a    I[]I N/A  I[x]I Y  I[]I N  I[]I U  I[]I A  Awareness of Biomedical Complications:   I[]I N/A  I[x]I Y  I[]I N  I[]I U  I[]I A  Understands Need for Lifetime Sobriety:   I[]I N/A  I[x]I Y  I[]I N  I[]I U  I[]I A  Acknowledges/Accepts Addiction:   I[]I N/A  I[]I Y  I[]I N  I[]I U  I[x]I A  Cessation of Problematic Alcohol/Drug Use ("Wants to Quit"): Interested in Cutting Back But Not Quitting Completely, Advised to Quit , Encouragement Provided, Motivational Interviewing Provided, Resources Provided  I[]I N/A  I[]I Y  I[]I N  I[]I U  I[x]I A  Motivation to Pursue Treatment: Moderate, Contemplative    Additional Relevant Substance Use History, As Applicable:       REVIEW OF SYSTEMS:     MEDICAL ROS:     Complete review of systems performed covering Constitutional, Eyes, ENT/Mouth, Cardiovascular, " Respiratory, Gastrointestinal, Genitourinary, Musculoskeletal, Skin, Neurologic, Endocrine, Heme/Lymph, and Allergy/Immune.     Complete review of systems was negative with the exception of the following positive symptoms: n/a    PSYCHIATRIC ROS:     I[]I Patient denies any pertinent Psychiatric ROS, and none is known.  I[]I Patient unable or unwilling to provide any Psychiatric ROS.    II[][x]II sleep disturbance: ** {Globally:17183:::1} {Positive for:69871:::1}  II[][x]II appetite/weight change: ** {Globally:91709:::1} {Positive for:70089:::1}  II[][x]II fatigue/anergia: ** {Globally:46872:::1} {Positive for:99532:::1}  II[][x]II impairment in focus/concentration: ** {Globally:31737:::1} {Positive for:81470:::1}     II[x][]II depression: ** {Globally:44171:::1} Positive for: occasional guilt and hopelessness Negative for: dysphoria, dysthymia, anhedonia, amotivation  II[][x]II anxiety/worry: ** {Globally:90146:::1} {Positive for:48086:::1} {Negative for:31519:::1}  II[][x]II dysregulated mood/behavior: ** {Globally:44406:::1} {Positive for:68236:::1} {Negative for:02712:::1}  II[][x]II manic symptomatology: ** {Globally:74685:::1} {Positive for:25313:::1} {Negative for:47745:::1}  II[][x]II psychosis: ** {Globally:50466:::1} {Positive for:13128:::1} {Negative for:16491:::1}    Additional Relevant Psychiatric ROS, As Applicable: ** {Positive for:09611:::1} {Negative for:56143:::1}      HISTORY:     I[]I Patient unable or unwilling to provide any history.  Information Obtained Via Collateral/Chart Review, Yet To Be Documented, If Available:     PAST PSYCHIATRIC:     I[]I Patient denies any past psychiatric history, and none is known.  I[]I Patient unable or unwilling to provide any past psychiatric history.    I[]I Y  I[x]I N  I[]I U  Psychiatric Diagnoses:   I[]I Y  I[x]I N  I[]I U  Current Psychiatric Provider (if Applicable):   I[]I Y  I[x]I N  I[]I U  Hx of Psychiatric Hospitalization:   I[]I Y  I[x]I N  I[]I U   Hx of Outpatient Psychiatric Treatment (psychiatry/psychotherapy):   I[]I Y  I[x]I N  I[]I U  Psychotropic Trials:   I[]I Y  I[x]I N  I[]I U  Prior Suicide Attempts:   I[]I Y  I[x]I N  I[]I U  Hx of Suicidal Ideation:   I[]I Y  I[x]I N  I[]I U  Hx of Homicidal Ideation:   I[]I Y  I[x]I N  I[]I U  Hx of Self-Injurious Behavior (Non-Suicidal):   I[]I Y  I[x]I N  I[]I U  Hx of Violence:   I[]I Y  I[x]I N  I[]I U  Documented Hx of Malingering:     Additional Relevant Past Psychiatric History, As Applicable:       TRAUMA:     I[]I Patient denies any history of trauma, abuse or neglect, and none is known.  I[]I Patient unable or unwilling to provide any history of trauma, abuse, or neglect.    I[]I Y  I[x]I N  I[]I U  Hx of Trauma/Neglect:   I[]I Y  I[x]I N  I[]I U  Hx of Physical Abuse:   I[]I Y  I[x]I N  I[]I U  Hx of Sexual Abuse:     Additional Relevant Trauma History, As Applicable:       FAMILY:     I[]I Y  I[]I N  I[x]I U              SOCIAL:     I[]I Patient unable or unwilling to provide any social history.    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, like cutting down trees  II[][]II On or Applying for Disability?: unknown  II[x][]II Functions Independently?:   II[x][]II Financially Stable?:   II[x][]II Domiciled?:   II[x][]II Lives Alone?: in an apartment  II[][]II Heterosexual/Cisgender?: unknown  II[][]II Currently in a Romantic Relationship?: unknown  II[][]II Ever ?: unknown  II[][]II Children/Dependents?: unknown  II[][]II Bahai/Spiritual?: unknown  II[][]II  History?: unknown  II[][x]II Engaged in Hobbies/Recreational Activities?: stays home to relax, denied any other hobbies  II[][x]II Access to a Gun?:     Additional Relevant Social History, As Applicable:        LEGAL:     I[]I Patient denies any legal history, and none is known.  I[]I Patient unable or unwilling to provide any legal history.    I[]I Y  I[x]I N  I[]I U  Current Legal Issues:   I[x]I Y  I[]I N  I[]I U  Past Charges/Convictions: drug possession  I[x]I Y  I[]I N  I[]I U  Hx of Incarceration: 7yrs for drug possession, released 04/18/2021    Additional Relevant Past Psychiatric History, As Applicable:       MEDICAL:     The patient's past medical history has been reviewed and updated as appropriate within the electronic medical record system.    General Medical History:     I[]I N  I[]I U      Denies any past medical hx  Patient Active Problem List   Diagnosis    Cellulitis of right ankle    Heroin use    Hepatitis C antibody positive in blood    Tobacco use     No past medical history on file.    NEUROLOGIC:     I[]I Y  I[x]I N  I[]I U  Hx of Seizure:   I[]I Y  I[x]I N  I[]I U  Hx of Significant Head Trauma (e.g., Loss of Consciousness, Concussion, Coma):      Additional Relevant Neurologic History, As Applicable:       MEDICATIONS:     Current Psychotropic Medications:     I[x]I N  I[]I U         Scheduled and PRN Medications:   The electronic chart was reviewed and updated as appropriate.  See Medcard for details.    Current Facility-Administered Medications:     acetaminophen tablet 1,000 mg, 1,000 mg, Oral, Q8H PRN, Judith Renner PA-C    acetaminophen tablet 650 mg, 650 mg, Oral, Q4H PRN, Judith Renner PA-C    albuterol-ipratropium 2.5 mg-0.5 mg/3 mL nebulizer solution 3 mL, 3 mL, Nebulization, Q4H PRN, Judith Renner PA-C    aluminum-magnesium hydroxide-simethicone 200-200-20 mg/5 mL suspension 30 mL, 30 mL, Oral, QID PRN, Judith Renner PA-C    bisacodyL suppository 10 mg, 10 mg, Rectal, Daily PRN, Judith Renner PA-C    cefTRIAXone (ROCEPHIN) 2 g/50 mL D5W IVPB, 2 g, Intravenous, Q24H, Brandon Jacob MD    dextrose 10% bolus 125 mL, 12.5 g, Intravenous, PRN, Judith BELL  AROLDO Renner    dextrose 10% bolus 250 mL, 25 g, Intravenous, PRN, Judith Renner PA-C    dicyclomine tablet 20 mg, 20 mg, Oral, Q6H PRN, Judith Renner PA-C    enoxaparin injection 40 mg, 40 mg, Subcutaneous, Daily, Judith Renner PA-C    glucagon (human recombinant) injection 1 mg, 1 mg, Intramuscular, PRN, Judith Renner PA-C    glucose chewable tablet 16 g, 16 g, Oral, PRN, Judith Renner PA-C    glucose chewable tablet 24 g, 24 g, Oral, PRN, Judith Renner PA-C    hydrOXYzine pamoate capsule 50 mg, 50 mg, Oral, Q8H PRN, Judith Renner PA-C    ibuprofen tablet 400 mg, 400 mg, Oral, Q6H PRN, Judith Renner PA-C    Lactobacillus rhamnosus GG capsule 1 capsule, 1 capsule, Oral, Daily, Judith Renner PA-C    loperamide capsule 2 mg, 2 mg, Oral, QID PRN, Judith Renner PA-C    melatonin tablet 6 mg, 6 mg, Oral, Nightly PRN, Judith Renner PA-C    methocarbamoL tablet 500 mg, 500 mg, Oral, Q6H PRN, Judith Renner PA-C    naloxone 0.4 mg/mL injection 0.02 mg, 0.02 mg, Intravenous, PRN, Judith Renner PA-C    nicotine 14 mg/24 hr 1 patch, 1 patch, Transdermal, Daily, Judith Renner PA-C    ondansetron disintegrating tablet 8 mg, 8 mg, Oral, Q8H PRN, Judith Renner PA-C    polyethylene glycol packet 17 g, 17 g, Oral, Daily, Judith Renner PA-C    prochlorperazine injection Soln 5 mg, 5 mg, Intravenous, Q6H PRN, Judith Renner PA-C    simethicone chewable tablet 80 mg, 1 tablet, Oral, QID PRN, Judith Renner PA-C    sodium chloride 0.9% flush 5 mL, 5 mL, Intravenous, PRN, Judith Renner PA-C    Pharmacy to dose Vancomycin consult, , , Once **AND** vancomycin - pharmacy to dose, , Intravenous, pharmacy to manage frequency, Brandon Jacob MD    vancomycin 1.75 g in 5 % dextrose 500 mL IVPB, 1,750 mg, Intravenous, Once **FOLLOWED BY** vancomycin 1.5 g in dextrose 5 % 250 mL IVPB (ready to mix), 1,500 mg, Intravenous, Q12H, Brandon Jacob,  "MD    ALLERGIES:     Review of patient's allergies indicates:  No Known Allergies    RISK:     RELIABILITY, ACCURACY & AGENCY:     The patient is deemed to be a reliable and factually accurate historian.    Inconsistencies between patient reporting, collateral information, and/or chart review are absent.    Legal Status: {XX-LegalStatus:91609}    PRESCRIPTION DRUG MONITORING:     LA/MS  AWARE  Site reviewed - { Options:56087}  ***    FIREARMS:     Access to Firearms:   See above for screening on access to firearms.  NOTE: patient counseled on gun safety.  NOTE: patient counseled on increased risks associated with gun ownership.      III[x]III  RISK PARAMETERS:     The following risk parameters were assessed during this evaluation:    I[]I Y  I[x]I N  I[]I U  I[]I A  Suicidal Ideation/Behavior: ** {Specifiers:90832}  I[]I Y  I[x]I N  I[]I U  I[]I A  Homicidal Ideation/Behavior: **  I[]I Y  I[x]I N  I[]I U  I[]I A  Violence: **  I[]I Y  I[x]I N  I[]I U  I[]I A  Self-Injurious Behavior: **    I[]I Y  I[x]I N  I[]I U  I[]I A  I[]I N/A  Minimization of Symptoms Suspected/Evident: **  I[]I Y  I[x]I N  I[]I U  I[]I A  I[]I N/A  Exaggeration of Symptoms Suspected/Evident: **      III[x]III  CLINICAL RISK ASSESSMENT:   {Contract for Safety:20264::" "," "}  {FV-Wfzhrixt-Myovfrtzzjncsfp:19269}    III[x]III  CLINICAL RISK DETERMINATION:     The following criteria were met for involuntary psychiatric admission:   I[]I None    I[]I Dangerous to Self    I[]I Dangerous to Others    I[]I Gravely Disabled  ***    EXAMINATION:     VITALS:     Vitals:    11/03/22 0211 11/03/22 0415 11/03/22 0534 11/03/22 0657   BP:  129/67 133/71 133/71   Patient Position:   Lying    Pulse:  65 64 64   Resp: 15 20 16 16   Temp:   97.5 °F (36.4 °C) 97.5 °F (36.4 °C)   TempSrc:   Oral    SpO2:  95% (!) 93% (!) 93%   Weight:    81.6 kg (180 lb)   Height:    5' 11" (1.803 m)       MENTAL STATUS EXAMINATION:     General Appearance: ** {Free " "Text:73592::"***"} adequately groomed, appropriately dressed, in no apparent distress, heavily tattooed  Behavior: ** {Free Text:50531::"***"} cooperative, under good behavioral control  Involuntary Movements and Motor Activity: ** {Free Text:50531::"***"} +tremors  Gait and Station: ** {Free Text:50531::"***"} unable to assess - patient lying down or seated  Speech: ** {Free Text:50531::"***"} normal rate, rhythm, volume, tone and pitch  Language: ** {Free Text:50531::"***"} fluent, speaks and understands English proficiently  Mood: "alright"  Affect: ** {Free Text:50531::"***"} constricted  Thought Process: ** {Free Text:50531::"***"} linear and goal-directed  Associations: ** {Free Text:50531::"***"} intact, no loosening of associations  Thought Content and Perceptions: ** {Free Text:50531::"***"} no suicidal or homicidal ideation, no evidence of psychosis  Sensorium and Orientation: ** {Free Text:50531::"***"} drowsy  Recent and Remote Memory: ** {Free Text:50531::"***"} grossly intact  Attention and Concentration: ** {Free Text:50531::"***"} attentive, not readily distractible  Fund of Knowledge: ** {Free Text:50531::"***"} grossly intact, used appropriate vocabulary, no significant deficits noted  Insight: ** {Free Text:50531::"***"} intact, demonstrates awareness of illness  Judgment: ** {Free Text:13671::"***"} intact, behavior is adequate/appropriate given the circumstances      ASSESSMENT:     A diagnostic psychiatric evaluation was performed and responsiveness to treatment was assessed.  {XX-ResponseTX:75700}    PSYCHOSOCIAL FACTORS  Stressors (Biopsychosocial, Cultural and Environmental): {XX-Stressors:83049}    STRENGTHS AND LIABILITIES   {XX-Strengths:00539}  {XX-Liabilities:66428}    III[x]III  INITIAL DIAGNOSES AND PROBLEMS:     ***      PLAN:     IMPRESSION AND RECOMMENDATIONS:     MANAGEMENT PLAN, TREATMENT GOALS, THERAPEUTIC TECHNIQUES/APPROACHES & CLINICAL REASONING    ***  {Disposition:37729::" " ""}    In cases of emergency, daily coverage provided by Acute/ER Psych MD, NP, PA, or SW, with contact numbers located in [facility] On Call Schedule.    III[x]III  PRESCRIPTION DRUG MANAGEMENT:     Prescription Drug Management entails the review, recommendation, or consideration without recommendation of medications, and as such was employed during the encounter.    Discussed, to the extent possible, diagnosis, risks and benefits of proposed treatment vs alternative treatments vs no treatment, potential side effects of these treatments and the inherent unpredictability of treatment. {Ability to Consent:86759}    ADDICTION COUNSELING AND MANAGEMENT:     Timely and targeted counseling is an important intervention in the treatment of substance use disorders.  Active and ongoing management is a hallmark of good clinical care.    Addiction counseling and management {XX-Was-Was Not:93994} employed during this encounter.    [x] The patient was counseled on abstinence from alcohol and substances of abuse (illicit and prescription).  [x] Harm reduction techniques were discussed, as warranted, to mitigate risk from problematic behaviors.  [x] Serial alcohol and drug laboratory testing (e.g. PETH, urine toxicology) is recommended to provide accountability, as well as to guide and refine substance abuse treatment moving forward.  [x] Relapse prevention and motivational interviewing was provided.  [x] Education was provided on 12 step recovery programs.    {XX-Withdrawal:01160::"     "}  {XX-Courtesy:82887}    Written material, if applicable, has additionally been provided, via the AVS or other pre-printed handouts.    In cases of emergency, daily coverage provided by Acute/ER Psych MD, NP, or SW, with contact numbers located in [facility] On Call Schedule    Deanna Mccrary  Department of Psychiatry  [facility]      DATA:     DIAGNOSTIC TESTING:     The chart was reviewed for recent diagnostic investigations, " and pertinent results are noted below.  ***    PERTINENT LABORATORY RESULTS:     Blood Counts, Electrolytes & Glucose: (i.e. WBC, ANC, Hemoglobin, Hematocrit, MCV, Platelets)  Lab Results   Component Value Date    WBC 7.66 11/03/2022    GRAN 4.4 11/03/2022    GRAN 57.9 11/03/2022    HGB 13.9 (L) 11/03/2022    HCT 40.8 11/03/2022    MCV 90 11/03/2022     11/03/2022     11/03/2022    K 3.6 11/03/2022    CALCIUM 8.6 (L) 11/03/2022    CO2 20 (L) 11/03/2022    ANIONGAP 13 11/03/2022    GLU 75 11/03/2022       Renal, Liver, Pancreas, Thyroid, Parathyroid, Prolactin, CPK, Lipids & Vitamin Levels: (i.e. Cr, BUN, Anion Gap, GFR, Urine Specific Gravity, Urine Protein, Microalburnin, AST, ALT, GGT, Alk Phos,Total Bili, Total Protein, Albumin, Ammonia, INR, Amylase, Lipase, TSH, Total T3, Total T4, Free T4 PTH, Prolactin, CPK, Cholesterol, Triglycerides, LDH, HDL, Vitamin B12, Folate, Vitamin D)  Lab Results   Component Value Date    CREATININE 0.7 11/03/2022    BUN 7 11/03/2022    EGFRNORACEVR >60.0 11/03/2022    AST 28 11/03/2022    ALT 23 11/03/2022    ALKPHOS 129 11/03/2022    BILITOT 0.4 11/03/2022    ALBUMIN 3.8 11/03/2022       Infection Diseases, Pregnancy Screenings & Drug Levels: (i.e. Hepatitis Panel, HIV, Syphilis, Urine & Blood Pregnancy Screens, beta hCG, Lithium, Valproic Acid, Carbamazepine, Lamotrigine, Phenytoin, Phenobarbital, Clozapine, Norclozapine, Clozapine + Norclozapine)   Lab Results   Component Value Date    HEPCAB Reactive (A) 11/03/2022    [id number] Non-reactive 11/03/2022       Addiction: (i.e. Urine Toxicology, Blood Alcohol, PETH, EtG, EtS, CDT, Buprenorphine, Norbuprenorphine)  No results found for: PCDSOALCOHOL, PCDSOBENZOD, BARBITURATES, PCDSCOMETHA, OPIATESCREEN, COCAINEMETAB, AMPHETAMINES, MARIJUANATHC, PCDSOPHENCYN, PCDSUBUPRE, PCDSUFENTANY, PCDSUOXYCOD, PCDSUTRAMA, ALCOHOLMEDIC, PETH, [id number], THEYLGLUCU, ETHYLSULF, CDT, BUPRENORPH, NORBUPRENOR    CARDIAC:     No results  found for this or any previous visit.    NEUROLOGIC:     No results found for this or any previous visit.

## 2022-11-03 NOTE — CONSULTS
"      [facility]  DEPARTMENT OF PSYCHIATRY    ADDICTION CONSULT INITIAL EVALUATION     SITE: [facility], [address]    11/3/2022 9:37 AM  Tye Florian III  1980  [id number]    DATE OF ADMISSION: 11/2/2022 11:01 PM  LENGTH OF STAY: 0 days    EXAMINING PRACTITIONER: Kathy Clancy    Inpatient consult to Psychiatry  Consult performed by: Kathy Clancy MD  Consult ordered by: Brandon Jacob MD        DELGADO:     I[]I Y = II[x][]II = Yes / Present / Present Though Denies / Endorses  I[]I N = II[][x]II = No / Absent / Absent Though Endorses / Denies  I[]I U = II[][]II = Unknown / Unable to Assess/Enact / Unwilling to Participate/Provide  I[]I A = II[x][x]II = Ambiguity / Uncertainty of Accuracy Exists  I[]I D = Denial or Minimization is Suspected/Evident  I[]I N/A = Non-Applicable    CHIEF COMPLAINT:     Patient Name: Tye Florian III  YOB: 1980  MRN: [id number]    Tye Florian III is a 42 y.o. male who is being seen by me for an initial psychiatric evaluation.  Tye Florian III presents with the chief complaint of: alcohol and/or drug addiction    CHART REVIEW:     Available documentation has been reviewed, and pertinent elements of the chart have been incorporated into this evaluation where appropriate.    Per Primary Team:  Tye Florian III is a 43 yo male IV drug user who presents with right ankle pain after injecting IV drugs into right ankle region. The majority of the history is collected from the ED and ortho notes as the patient refused to participate in an interview with me. Per ortho note "Patients states he was trying to inject heroin into his right ankle and thought he put it in a vein. For the past 1 day he been having pain in that ankle that has been getting worse with some associated redness and swelling and pain with weight bearing. When asked to point to the pain he points to his posterolateral ankle. Denies prior " "injuries or surgeries or infections to the ankle. Denies other MSK pains or paresthesias. Denies fevers or chills." When asked about most recent heroin use and frequency patient states "never" and sleeps through the rest of the exam despite being arousable to verbal stimuli. Denies pain currently.      In the ED, AFVSS. WBC 7.6. Sed rate wnl 16. CRP elevated 14.  Bicarb 20, Cr 0.7. Hepatitis V ab reactive. Ortho consulted and attempted right ankle joint aspiration no joint fluid obtained. Imaging R ankle/foot without fracture. MRI R ankle without evidence of soft tissue abscess, osteomyelitis, or septic arthritis. Subcutaneous soft tissue edema about the ankle, most pronounced laterally. S/p clindamycin 600 mg IV, benadryl 25 mg IV, morphine 4 mg IV. Admitted to  for IV abx.    The patient's last encounter in the psychiatry department was on: Visit date not found    PRESENTATION:     HISTORY OF PRESENT ILLNESS:     Tye Florian III is a 42yoM admitted for treatment of cellulitis after injecting heroin in his R ankle. Psychiatry consulted for substance abuse evaluation and rehab resources. Patient was seen in bed and was amenable to interview with the Addiction Psychiatry team after using the bathroom. He was polite but minimally engaged, mostly yes/no or short answers. He describes coming to the hospital yesterday because he tried to "shoot up" into his ankle 2 days ago and "must have missed a vein". His R ankle was swollen and painful so he came to the hospital. He denies PMH but admits he has not seen a PCP or psychiatrist recently. He admits to IVDU off and on for about 15 yrs. Drugs of choice are IV heroin, intranasal cocaine, and tobacco cigarettes. He uses up to 1g of IV heroin daily, about $20 worth of cocaine once a week, and smokes about 1 pack of cigarettes daily. He also drinks alcohol regularly, up to 2x/week, usually 3-4 beers at a time. Longest period of sobriety was for 7 yrs while he was " "in [facility]. Has been sober outside of [facility] for an unknown period of time that he described as "not long". Has attended AA/NA groups in the past but did not feel it helped. He has also seen therapists in the past but denies ever seeing a psychiatrist or taking psychiatric medications. Denies ever doing inpatient rehab, but expressed interest in attending. He is ambivalent about seeking substance use treatment, stating that he "should try to quit," and "probably should go" to rehab. Pt became more drowsy and started falling asleep during later portion of interview. Interview was terminated due to pt's scheduled transport to [facility]. Besides his substance use disorder, patient denies psychiatric history including past diagnoses, medications, and hospitalizations. He denies current or past symptoms of depression, generalized or social anxiety, julee, psychosis, and PTSD.     COWS score 2  Positive for mild tremor. Denies restlessness, GI upset, n/v, sweats, chills, rhinorrhea or lacrimation.             .  COLLATERAL:     Patient information was obtained from patient.    Also present with the patient at the time of the evaluation: patient evaluated alone.    Patient refuses to provide contact information for collateral.`                .  ADDICTION:     SUBSTANCE USE:     I[]I Patient denies any substance use history, and none is known.  I[]I Patient unable or unwilling to provide any substance use history.    I[x]I Y  I[]I N  I[]I U  I[x]I Current  I[]I Former  Nicotine Use: 1ppd  I[x]I Y  I[]I N  I[]I U  I[x]I Current  I[]I Former  Alcohol Misuse/Abuse: 3-4 beers 5x/month  I[x]I Y  I[]I N  I[]I U  I[]I Current  I[]I Former  Illicit Drug Use/Misuse/Abuse: daily use, heroin ($20 worth to 1g); cocaine ($20 worth, weekly)  I[x]I Y  I[]I N  I[]I U  I[]I Current  I[x]I Former  Misuse/Abuse of Rx Medications: Xanax in the past, does not currently use  I[]I Y  I[x]I N  I[]I U  Hx of Detox:   I[]I Y  I[x]I N  I[]I U  Hx of Rehab: " "    Current Alcohol Use:   I[]I U    I[]I N    I[]I Rare    I[x]I Social    I[x]I Exceeds Recommended Health Limits    I[]I Binge Pattern    I[]I Daily or Near Daily    I[]I Physiologically Dependent    I[]I N/A  I[]I U  Average Consumption (e.g. type, quantity, frequency): 3-4 beers 5-8x/month  I[]I N/A  I[]I U  Last drink: yesterday afternoon    Substances Used (Lifetime):   I[]I U    I[]I N    II[][x]II Cannabis    II[x][]II Cocaine    II[x][]II Heroin   II[x][]II Opioids    II[][x]II Methamphetamine    II[][x]II Stimulants    II[x][]II Benzodiazepines    I[]I Other:     Tobacco Cessation ("Wants to Quit"):   I[]I N/A  I[]I U  II[][x]II Interested in Quitting    II[x][]II Uninterested in Quitting   II[][x]II Making Efforts to Cut Back or Quit    II[x][]II Advised to Quit    II[][x]II Assistance Provided    II[x][]II Encouragement Provided    II[x][]II Motivational Interviewing Provided    II[x][]II Resources Provided    II[][x]II Referral Made     I[]I N/A  I[x]I Y  I[]I N  I[]I U  Meets Criteria for Substance Use Disorder:   I[]I N/A  I[x]I Y  I[]I N  I[]I U  Advised to Quit/Cut Back:   I[]I N/A  I[x]I Y  I[]I N  I[]I U  Motivated to Do So: Patient seems ambivalent, feels that he "should" get treatment    ADDITIONAL FACTORS RELATED TO ADDICTION:     I[x]I Y  I[]I N  I[]I U  Hx of IVDU: daily IV heroin use  I[]I Y  I[x]I N  I[]I U  Hx of Accidental Overdose:   I[]I Y  I[x]I N  I[]I U  Hx of DUI:   I[]I Y  I[x]I N  I[]I U  Hx of Complicated Withdrawal (i.e. Seizures and/or Delirium Tremens):   I[]I Y  I[x]I N  I[]I U  Hx of Known/Suspected Substance-Induced Psychiatric Disorder:   I[]I Y  I[x]I N  I[]I U  Hx of Medication Assisted Treatment:   I[x]I Y  I[]I N  I[]I U  Hx of Twelve Step Program (or Equivalent) Involvement: AA, but did not feel it helped  I[]I Y  I[x]I N  I[]I U  Currently Exhibits Signs of Intoxication:   I[x]I Y  I[]I N  I[]I U  Currently Exhibits Signs of Withdrawal: mild tremor only  I[]I Y  " "I[x]I N  I[]I U  Currently Active in Recovery:     Substance(s) of Choice: IV heroin, intranasal cocaine, tobacco  Substances Used Currently/Recently: IV heroin, intranasal cocaine, tobacco  Duration of Sobriety/Abstinence: 7 yrs while in shelter  Date of Last Use of Substances: last used heroin and alcohol yesterday  View/Acceptance of Twelve Step (or Equivalent) Programs: Skeptical, does not feel it helps  Social Support: denied having family and friends  Spouse/Partner Consumption: n/a    I[]I N/A  I[x]I Y  I[]I N  I[]I U  I[]I A  Awareness of Biomedical Complications:   I[]I N/A  I[x]I Y  I[]I N  I[]I U  I[x]I A  Understands Need for Lifetime Sobriety:   I[]I N/A  I[x]I Y  I[]I N  I[]I U  I[]I A  Acknowledges/Accepts Addiction:   I[]I N/A  I[x]I Y  I[x]I N  I[]I U  I[x]I A  Cessation of Problematic Alcohol/Drug Use ("Wants to Quit"): Interested in Cutting Back But Not Quitting Completely, Advised to Quit , Encouragement Provided, Motivational Interviewing Provided, Resources Provided  I[]I N/A  I[x]I Y  I[x]I N  I[]I U  I[x]I A  Motivation to Pursue Treatment: Ambivalent, Pre-Contemplative    Additional Relevant Substance Use History, As Applicable:       REVIEW OF SYSTEMS:     MEDICAL ROS:     Complete review of systems performed covering Constitutional, Eyes, ENT/Mouth, Cardiovascular, Respiratory, Gastrointestinal, Genitourinary, Musculoskeletal, Skin, Neurologic, Endocrine, Heme/Lymph, and Allergy/Immune.     Complete review of systems was negative with the exception of the following positive symptoms: R ankle pain, somnolence    PSYCHIATRIC ROS:     I[]I Patient denies any pertinent Psychiatric ROS, and none is known.  I[]I Patient unable or unwilling to provide any Psychiatric ROS.    II[][x]II sleep disturbance:   II[][x]II appetite/weight change:    II[][x]II fatigue/anergia:     II[][x]II impairment in focus/concentration:       II[][x]II depression: Positive for: occasional guilt and hopelessness; " Negative for all other symptoms of depression    II[][x]II anxiety/worry:     II[][x]II dysregulated mood/behavior:     II[][x]II manic symptomatology:     II[][x]II psychosis:        Additional Relevant Psychiatric ROS, As Applicable: denied current symptoms of withdrawal despite exhibiting slight BUE tremor; COWS score of 2        HISTORY:     I[]I Patient unable or unwilling to provide any history.  Information Obtained Via Collateral/Chart Review, Yet To Be Documented, If Available:     PAST PSYCHIATRIC:     I[x]I Patient denies any past psychiatric history, and none is known.  I[]I Patient unable or unwilling to provide any past psychiatric history.    I[]I Y  I[x]I N  I[]I U  Psychiatric Diagnoses:   I[]I Y  I[x]I N  I[]I U  Current Psychiatric Provider (if Applicable):   I[]I Y  I[x]I N  I[]I U  Hx of Psychiatric Hospitalization:   I[x]I Y  I[]I N  I[]I U  Hx of Outpatient Psychiatric Treatment (psychiatry/psychotherapy): unknown psychotherapy provider  I[]I Y  I[x]I N  I[]I U  Psychotropic Trials:   I[]I Y  I[x]I N  I[]I U  Prior Suicide Attempts:   I[]I Y  I[x]I N  I[]I U  Hx of Suicidal Ideation:   I[]I Y  I[x]I N  I[]I U  Hx of Homicidal Ideation:   I[]I Y  I[x]I N  I[]I U  Hx of Self-Injurious Behavior (Non-Suicidal):   I[]I Y  I[x]I N  I[]I U  Hx of Violence:   I[]I Y  I[x]I N  I[]I U  Documented Hx of Malingering:     Additional Relevant Past Psychiatric History, As Applicable:       TRAUMA:     I[x]I Patient denies any history of trauma, abuse or neglect, and none is known.  I[]I Patient unable or unwilling to provide any history of trauma, abuse, or neglect.    I[]I Y  I[x]I N  I[]I U  Hx of Trauma/Neglect:   I[]I Y  I[x]I N  I[]I U  Hx of Physical Abuse:   I[]I Y  I[x]I N  I[]I U  Hx of Sexual Abuse:     Additional Relevant Trauma History, As Applicable:       FAMILY:     I[]I Y  I[]I N  I[x]I U        Patient denied close family relationships; did not assess potential family history due to  truncated interview.      SOCIAL:     I[]I Patient unable or unwilling to provide any social history.    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  II[][x]II On or Applying for Disability?:   II[x][]II Functions Independently?:   II[x][]II Financially Stable?:   II[x][]II Domiciled?:   II[x][]II Lives Alone?:   II[x][]II Heterosexual/Cisgender?:   II[][x]II Currently in a Romantic Relationship?:   II[][x]II Ever ?:   II[][x]II Children/Dependents?:   II[][x]II Shinto/Spiritual?:   II[][x]II  History?:   II[][x]II Engaged in Hobbies/Recreational Activities?: patient unable to describe sources of rupesh/relaxation  II[][x]II Access to a Gun?:     Additional Relevant Social History, As Applicable:       LEGAL:     I[]I Patient denies any legal history, and none is known.  I[]I Patient unable or unwilling to provide any legal history.    I[]I Y  I[x]I N  I[]I U  Current Legal Issues: denies  I[x]I Y  I[]I N  I[]I U  Past Charges/Convictions: drug-related charges  I[x]I Y  I[]I N  I[]I U  Hx of Incarceration: 7yrs for drug charges, released 04/18/2021    Additional Relevant Past Psychiatric History, As Applicable:       MEDICAL:     The patient's past medical history has been reviewed and updated as appropriate within the electronic medical record system.    General Medical History:     I[]I N  I[]I U      Denies any past medical hx      Patient Active Problem List   Diagnosis    Cellulitis of right ankle    Heroin use    Hepatitis C antibody positive in blood    Tobacco use       NEUROLOGIC:     I[]I Y  I[x]I N  I[]I U  Hx of Seizure:   I[]I Y  I[x]I N  I[]I U  Hx of Significant Head Trauma (e.g., Loss of Consciousness, Concussion, Coma):      Additional Relevant Neurologic History, As Applicable:        MEDICATIONS:     Current Psychotropic Medications:     I[x]I N  I[]I U           Scheduled and PRN Medications:   The electronic chart was reviewed and updated as appropriate.  See Medcard for details.    Current Facility-Administered Medications:     acetaminophen tablet 1,000 mg, 1,000 mg, Oral, Q8H PRN, Judith Renner PA-C    acetaminophen tablet 650 mg, 650 mg, Oral, Q4H PRN, Judith Renner PA-C    albuterol-ipratropium 2.5 mg-0.5 mg/3 mL nebulizer solution 3 mL, 3 mL, Nebulization, Q4H PRN, Judith Renner PA-C    aluminum-magnesium hydroxide-simethicone 200-200-20 mg/5 mL suspension 30 mL, 30 mL, Oral, QID PRN, Judith Renner PA-C    bisacodyL suppository 10 mg, 10 mg, Rectal, Daily PRN, Judith Renner PA-C    cefTRIAXone (ROCEPHIN) 2 g/50 mL D5W IVPB, 2 g, Intravenous, Q24H, Brandon Jacob MD    dextrose 10% bolus 125 mL, 12.5 g, Intravenous, PRN, Judith Renner PA-C    dextrose 10% bolus 250 mL, 25 g, Intravenous, PRN, Judith Renner PA-C    dicyclomine tablet 20 mg, 20 mg, Oral, Q6H PRN, Judith Renner PA-C    enoxaparin injection 40 mg, 40 mg, Subcutaneous, Daily, Judith Renner PA-C    glucagon (human recombinant) injection 1 mg, 1 mg, Intramuscular, PRN, Judith Renner PA-C    glucose chewable tablet 16 g, 16 g, Oral, PRN, Judith Renner PA-C    glucose chewable tablet 24 g, 24 g, Oral, PRN, Judith Renner PA-C    hydrOXYzine pamoate capsule 50 mg, 50 mg, Oral, Q8H PRN, Judith Renner PA-C    ibuprofen tablet 400 mg, 400 mg, Oral, Q6H PRN, Judith Renner PA-C    Lactobacillus rhamnosus GG capsule 1 capsule, 1 capsule, Oral, Daily, Judith Renner PA-C    loperamide capsule 2 mg, 2 mg, Oral, QID PRN, Judith Renner PA-C    melatonin tablet 6 mg, 6 mg, Oral, Nightly PRN, Judith Renner PA-C    methocarbamoL tablet 500 mg, 500 mg, Oral, Q6H PRN, Judith Renner PA-C    naloxone 0.4 mg/mL injection 0.02 mg, 0.02 mg, Intravenous,  PRN, Judith Renner PA-C    nicotine 14 mg/24 hr 1 patch, 1 patch, Transdermal, Daily, Judith Renner PA-C    ondansetron disintegrating tablet 8 mg, 8 mg, Oral, Q8H PRN, Judith Renner PA-C    polyethylene glycol packet 17 g, 17 g, Oral, Daily, Judith Renner PA-C    prochlorperazine injection Soln 5 mg, 5 mg, Intravenous, Q6H PRN, Judith Renner PA-C    simethicone chewable tablet 80 mg, 1 tablet, Oral, QID PRN, Judith Renner PA-C    sodium chloride 0.9% flush 5 mL, 5 mL, Intravenous, PRN, Judith Renner PA-C    Pharmacy to dose Vancomycin consult, , , Once **AND** vancomycin - pharmacy to dose, , Intravenous, pharmacy to manage frequency, Brandon Jacob MD    vancomycin 1.75 g in 5 % dextrose 500 mL IVPB, 1,750 mg, Intravenous, Once **FOLLOWED BY** vancomycin 1.5 g in dextrose 5 % 250 mL IVPB (ready to mix), 1,500 mg, Intravenous, Q12H, Brandon Jacob MD    ALLERGIES:     Review of patient's allergies indicates:  No Known Allergies    RISK:     RELIABILITY, ACCURACY & AGENCY:     The patient is deemed to be a reliable and factually accurate historian, a vague historian.    Inconsistencies between patient reporting, collateral information, and/or chart review are not observed or manifest.    Legal Status: The patient is currently here on a voluntary legal status.    PRESCRIPTION DRUG MONITORING:     LA/MS  AWARE  Site reviewed - No recent discrepancies or irregularities are noted.  Recent course of Hydrocodone-Acetaminophen 5-325mg (12 tablets) prescribed by [facility] MD after his presentation 10/1/22 for dental pain.    FIREARMS:     Access to Firearms:   See above for screening on access to firearms.  NOTE: patient counseled on gun safety.  NOTE: patient counseled on increased risks associated with gun ownership.      III[x]III  RISK PARAMETERS:     The following risk parameters were assessed during this evaluation:    I[]I Y  I[x]I N  I[]I U  I[]I A  Suicidal  "Ideation/Behavior:   I[]I Y  I[x]I N  I[]I U  I[]I A  Homicidal Ideation/Behavior:   I[]I Y  I[x]I N  I[]I U  I[]I A  Violence:   I[]I Y  I[x]I N  I[]I U  I[]I A  Self-Injurious Behavior:   I[]I Y  I[x]I N  I[]I U  I[]I A  I[]I N/A  Minimization of Symptoms Suspected/Evident:   I[]I Y  I[x]I N  I[]I U  I[]I A  I[]I N/A  Exaggeration of Symptoms Suspected/Evident:     III[x]III  CLINICAL RISK ASSESSMENT:         Currently does not meet or exceed the threshold for psychiatric hospitalization, as the patient can be managed safely and successfully in a less restrictive level of care or the community.    III[x]III  CLINICAL RISK DETERMINATION:     The following criteria were met for involuntary psychiatric admission:   I[x]I None    I[]I Dangerous to Self    I[]I Dangerous to Others    I[]I Gravely Disabled      EXAMINATION:     VITALS:     Vitals:    11/03/22 0415 11/03/22 0534 11/03/22 0657 11/03/22 0919   BP: 129/67 133/71 133/71 117/79   Patient Position:  Lying     Pulse: 65 64 64 75   Resp: 20 16 16 18   Temp:  97.5 °F (36.4 °C) 97.5 °F (36.4 °C) 97.5 °F (36.4 °C)   TempSrc:  Oral     SpO2: 95% (!) 93% (!) 93% (!) 92%   Weight:   81.6 kg (180 lb)    Height:   5' 11" (1.803 m)        MENTAL STATUS EXAMINATION:     General Appearance: adequately groomed, appropriately dressed, in no apparent distress, heavily tattooed    Behavior: cooperative, under good behavioral control, minimally engaged    Involuntary Movements and Motor Activity: +tremors    Gait and Station: unable to assess - patient lying down or seated with R ankle injury    Speech: normal rate, rhythm, volume, tone and pitch    Language: fluent, speaks and understands English proficiently    Mood: "alright"  Affect: constricted, apathetic    Thought Process: linear and goal-directed    Associations: intact, no loosening of associations    Thought Content and Perceptions: no suicidal or homicidal ideation, no evidence of psychosis    Sensorium and " Orientation: drowsy    Recent and Remote Memory: grossly intact    Attention and Concentration: attentive, not readily distractible    Fund of Knowledge: grossly intact, used appropriate vocabulary, no significant deficits noted    Insight: intact, demonstrates awareness of illness    Judgment: intact, behavior is adequate/appropriate given the circumstances        ASSESSMENT:     A diagnostic psychiatric evaluation was performed and responsiveness to treatment was assessed.  The patient demonstrates adequate ability/capacity to respond to treatment.    PSYCHOSOCIAL FACTORS  Stressors (Biopsychosocial, Cultural and Environmental): substance use/addiction, legal    STRENGTHS AND LIABILITIES   Strength: Patient accepts guidance/feedback.  Strength: Patient has reasonable judgment.  Liability: Patient has poor or no support network.  Liability: Patient is in active addiction.    III[x]III  INITIAL DIAGNOSES AND PROBLEMS:     Opioid use disorder, severe  Tobacco use disorder, severe  Alcohol use disorder, mild  Cocaine use disorder, mild      PLAN:     IMPRESSION AND RECOMMENDATIONS:     MANAGEMENT PLAN, TREATMENT GOALS, THERAPEUTIC TECHNIQUES/APPROACHES & CLINICAL REASONING    - Disposition: Once medically cleared; No need for inpatient psychiatric hospitalization, disposition per primary treatment team.  - Contingent on accessibility and willingness, patient would benefit from the following referrals: addiction rehab program, mutual self-help groups (e.g. Alcoholics Anonymous, IZEA), and buprenorphine MAT provider.  - Continue opioid withdrawal protocol while patient is in house, including supportive measures,frequent monitoring of vitals and COWS, and use of PRN clonidine if hemodynamically stable.  -Continue symptomatic management of opioid withdrawal with PRN Acetaminophen, Maalox, Dulcolax, Bentyl, Vistaril, Ibuprofen, Lomodil, Melatonin, Robaxin, Zofran, Compazine, Simethicone  - Patient counseled on  abstinence from alcohol and substances of abuse (illicit and prescription).  - Counseled on full engagement in 12 step (or equivalent) recovery program(s), including acquisition of a sponsor.  - Recommend patient enroll in addiction rehab program after discharge and medical stabilization.  - Addiction resource sheet provided to patient.  - MAT for opioid use disorder was discussed including naltrexone and buprenorphine.  - UPON DISCHARGE, PROVIDE PATIENT WITH NALOXONE PRESCRIPTION FOR EMERGENCY USE OF OPIOID REVERSAL.  If naloxone is utilized, patient instructed to seek immediate medical assistance and call 911.      In cases of emergency, daily coverage provided by Acute/ER Psych MD, NP, PA, or SW, with contact numbers located in [facility] On Call Schedule.    III[x]III  PRESCRIPTION DRUG MANAGEMENT:     Prescription Drug Management entails the review, recommendation, or consideration without recommendation of medications, and as such was employed during the encounter.    Discussed, to the extent possible, diagnosis, risks and benefits of proposed treatment vs alternative treatments vs no treatment, potential side effects of these treatments and the inherent unpredictability of treatment. The patient's ability to understand, participate and engage in a conversation surrounding this was deemed to be: intact.    ADDICTION COUNSELING AND MANAGEMENT:     Timely and targeted counseling is an important intervention in the treatment of substance use disorders.  Active and ongoing management is a hallmark of good clinical care.    Addiction counseling and management WAS employed during this encounter.    [x] The patient was counseled on abstinence from alcohol and substances of abuse (illicit and prescription).  [x] Harm reduction techniques were discussed, as warranted, to mitigate risk from problematic behaviors.  [x] Serial alcohol and drug laboratory testing (e.g. PETH, urine toxicology) is recommended to  provide accountability, as well as to guide and refine substance abuse treatment moving forward.  [x] Relapse prevention and motivational interviewing was provided.  [x] Education was provided on 12 step recovery programs.      - The case was discussed and care was coordinated with the treatment team, including clinical impression, assessment, and treatment recommendations.    Written material, if applicable, has additionally been provided, via the AVS or other pre-printed handouts.    In cases of emergency, daily coverage provided by Acute/ER Psych MD, NP, or SW, with contact numbers located in [facility] On Call Schedule    Kathy Clancy  Department of Psychiatry  [facility]      DATA:     DIAGNOSTIC TESTING:     The chart was reviewed for recent diagnostic investigations, and pertinent results are noted below.      PERTINENT LABORATORY RESULTS:     Blood Counts, Electrolytes & Glucose: (i.e. WBC, ANC, Hemoglobin, Hematocrit, MCV, Platelets)  Lab Results   Component Value Date    WBC 7.66 11/03/2022    GRAN 4.4 11/03/2022    GRAN 57.9 11/03/2022    HGB 13.9 (L) 11/03/2022    HCT 40.8 11/03/2022    MCV 90 11/03/2022     11/03/2022     11/03/2022    K 3.6 11/03/2022    CALCIUM 8.6 (L) 11/03/2022    CO2 20 (L) 11/03/2022    ANIONGAP 13 11/03/2022    GLU 75 11/03/2022       Renal, Liver, Pancreas, Thyroid, Parathyroid, Prolactin, CPK, Lipids & Vitamin Levels: (i.e. Cr, BUN, Anion Gap, GFR, Urine Specific Gravity, Urine Protein, Microalburnin, AST, ALT, GGT, Alk Phos,Total Bili, Total Protein, Albumin, Ammonia, INR, Amylase, Lipase, TSH, Total T3, Total T4, Free T4 PTH, Prolactin, CPK, Cholesterol, Triglycerides, LDH, HDL, Vitamin B12, Folate, Vitamin D)  Lab Results   Component Value Date    CREATININE 0.7 11/03/2022    BUN 7 11/03/2022    EGFRNORACEVR >60.0 11/03/2022    AST 28 11/03/2022    ALT 23 11/03/2022    ALKPHOS 129 11/03/2022    BILITOT 0.4 11/03/2022    ALBUMIN 3.8 11/03/2022        Infection Diseases, Pregnancy Screenings & Drug Levels: (i.e. Hepatitis Panel, HIV, Syphilis, Urine & Blood Pregnancy Screens, beta hCG, Lithium, Valproic Acid, Carbamazepine, Lamotrigine, Phenytoin, Phenobarbital, Clozapine, Norclozapine, Clozapine + Norclozapine)   Lab Results   Component Value Date    HEPCAB Reactive (A) 11/03/2022    HGH24HGQH Non-reactive 11/03/2022       Addiction: (i.e. Urine Toxicology, Blood Alcohol, PETH, EtG, EtS, CDT, Buprenorphine, Norbuprenorphine)  No results found for: PCDSOALCOHOL, PCDSOBENZOD, BARBITURATES, PCDSCOMETHA, OPIATESCREEN, COCAINEMETAB, AMPHETAMINES, MARIJUANATHC, PCDSOPHENCYN, PCDSUBUPRE, PCDSUFENTANY, PCDSUOXYCOD, PCDSUTRAMA, ALCOHOLMEDIC, PETH, [id number], THEYLGLUCU, ETHYLSULF, CDT, BUPRENORPH, NORBUPRENOR    CARDIAC:     No results found for this or any previous visit.    NEUROLOGIC:     No results found for this or any previous visit.

## 2022-11-03 NOTE — PROGRESS NOTES
Pharmacokinetic Initial Assessment: IV Vancomycin    Assessment/Plan:    Initiate intravenous vancomycin with loading dose of 1750 mg once followed by a maintenance dose of vancomycin 1500 mg IV every 12 hours  Desired empiric serum trough concentration is 10 to 20 mcg/mL  Draw vancomycin trough level 60 min prior to fourth dose on 11/4 at approximately 2000  Pharmacy will continue to follow and monitor vancomycin.      Please contact pharmacy at extension [phone] with any questions regarding this assessment.     Thank you for the consult,   Casper Cain       Patient brief summary:  Tye Florian III is a 42 y.o. male initiated on antimicrobial therapy with IV Vancomycin for treatment of suspected skin & soft tissue infection    Drug Allergies:   Review of patient's allergies indicates:  No Known Allergies    Actual Body Weight:   81.6 kg    Renal Function:   Estimated Creatinine Clearance: 149.4 mL/min (based on SCr of 0.7 mg/dL).,     Dialysis Method (if applicable):  N/A    CBC (last 72 hours):  Recent Labs   Lab Result Units 11/03/22  0112   WBC K/uL 7.66   Hemoglobin g/dL 13.9*   Hematocrit % 40.8   Platelets K/uL 222   Gran % % 57.9   Lymph % % 28.1   Mono % % 8.6   Eosinophil % % 4.7   Basophil % % 0.4   Differential Method  Automated       Metabolic Panel (last 72 hours):  Recent Labs   Lab Result Units 11/03/22  0112   Sodium mmol/L 137   Potassium mmol/L 3.6   Chloride mmol/L 104   CO2 mmol/L 20*   Glucose mg/dL 75   BUN mg/dL 7   Creatinine mg/dL 0.7   Albumin g/dL 3.8   Total Bilirubin mg/dL 0.4   Alkaline Phosphatase U/L 129   AST U/L 28   ALT U/L 23       Drug levels (last 3 results):  No results for input(s): VANCOMYCINRA, VANCORANDOM, VANCOMYCINPE, VANCOPEAK, VANCOMYCINTR, VANCOTROUGH in the last 72 hours.    Microbiologic Results:  Microbiology Results (last 7 days)       Procedure Component Value Units Date/Time    Culture, Anaerobe [[id number]] Collected: 11/03/22 0149    Order Status:  Canceled Specimen: Joint Fluid from Ankle, Right     Aerobic culture [[id number]] Collected: 11/03/22 0149    Order Status: Canceled Specimen: Bone from Ankle, Right     AFB Culture & Smear [[id number]] Collected: 11/03/22 0149    Order Status: Canceled Specimen: Joint Fluid from Ankle, Right     Fungus culture [[id number]] Collected: 11/03/22 0149    Order Status: Canceled Specimen: Joint Fluid from Ankle, Right     Gram stain [[id number]] Collected: 11/03/22 0149    Order Status: Canceled Specimen: Joint Fluid from Ankle, Right

## 2022-11-03 NOTE — ASSESSMENT & PLAN NOTE
Patient unwilling to participate in initital interview. Extent of and last use of heroin unknown. Unable to calculate COWS.    - IVFs prn for volume support  - patient is able to protect airway  - VSS  - Narcan PRN   - aspiration precautions   - telemetry   - neuro checks, vital signs Q4H    For opiate withdrawall:  -dicyclomine 20 mg q6hr PRN abdominal muscle cramps  -loperamide 2 mg q3hr PRN diarrhea  -methocarbamol 500 mg q6hr PRN muscle spasm  -ibuprofen 400 mg q6hr PRN pain  -zofran 4mg q8hr PRN nausea or phenergan 12.5 mg q8hr PRN nausea  -vistaril 50mg q8hr PRN anxiety

## 2022-11-03 NOTE — ED PROVIDER NOTES
Encounter Date: 11/2/2022       History     Chief Complaint   Patient presents with    Ankle Pain     Injected heroin into R ankle yesterday, red and swollen, tender to palpation. Denies drug use today. +ETOH 4 hours ago     43 yo M who presents with pain to R ankle and foot that started after he injected heroin into his R ankle 2 days ago. He thought he put it into a vein but is not sure what happened. Pain is so severe that he cannot walk. This has not happened before. No prior injury to that ankle. He denies fever, chills, nausea, vomiting, diarrhea. Denies pain elsewhere.     The history is provided by the patient.   Review of patient's allergies indicates:  No Known Allergies  History reviewed. No pertinent past medical history.  No past surgical history on file.  No family history on file.  Social History     Tobacco Use    Smoking status: Every Day     Types: Cigarettes    Smokeless tobacco: Never   Substance Use Topics    Alcohol use: Yes    Drug use: Yes     Types: IV     Review of Systems  General: No fever.  No chills.  Eyes: No visual changes.  Head: No headache.    Integument: No rashes or lesions.  Chest: No shortness of breath.  Cardiovascular: No chest pain.  Abdomen: No abdominal pain.  No nausea or vomiting.  Urinary: No abnormal urination.  Neurologic: No focal weakness.  No numbness.  Hematologic: No easy bruising.  Endocrine: No excessive thirst or urination.    Physical Exam     Initial Vitals [11/02/22 2157]   BP Pulse Resp Temp SpO2   125/68 91 16 98.8 °F (37.1 °C) 95 %      MAP       --         Physical Exam    Nursing note and vitals reviewed.  Constitutional: He appears well-developed and well-nourished. He is not diaphoretic. No distress.   HENT:   Head: Normocephalic and atraumatic.   Eyes: Conjunctivae and EOM are normal.   Neck: Neck supple.   Normal range of motion.  Cardiovascular:  Normal rate, regular rhythm and intact distal pulses.           Pulmonary/Chest: No respiratory  distress.   Abdominal: Abdomen is soft.   Musculoskeletal:         General: Tenderness (R lateral ankle and foot) and edema present.      Cervical back: Normal range of motion and neck supple.     Neurological: He is alert and oriented to person, place, and time. GCS score is 15. GCS eye subscore is 4. GCS verbal subscore is 5. GCS motor subscore is 6.   Skin: Skin is warm and dry.   Psychiatric: He has a normal mood and affect. His behavior is normal. Judgment and thought content normal.       ED Course   Procedures  Labs Reviewed   CBC W/ AUTO DIFFERENTIAL - Abnormal; Notable for the following components:       Result Value    RBC 4.52 (*)     Hemoglobin 13.9 (*)     All other components within normal limits    Narrative:     Release to patient->Immediate   COMPREHENSIVE METABOLIC PANEL - Abnormal; Notable for the following components:    CO2 20 (*)     Calcium 8.6 (*)     All other components within normal limits    Narrative:     Release to patient->Immediate   C-REACTIVE PROTEIN - Abnormal; Notable for the following components:    CRP 14.0 (*)     All other components within normal limits    Narrative:     Release to patient->Immediate   HEPATITIS C ANTIBODY - Abnormal; Notable for the following components:    Hepatitis C Ab Reactive (*)     All other components within normal limits    Narrative:     Release to patient->Immediate   SEDIMENTATION RATE    Narrative:     Release to patient->Immediate   POCT GLUCOSE, HAND-HELD DEVICE          Imaging Results              MRI Ankle W WO Contrast Right (Final result)  Result time 11/03/22 04:44:31      Final result by Abad Garza MD (11/03/22 04:44:31)                   Impression:      No evidence of soft tissue abscess, osteomyelitis, or septic arthritis.    Subcutaneous soft tissue edema about the ankle, most pronounced laterally.  Correlate clinically for cellulitis in the clinical setting of suspected infection.    Electronically signed by resident: Lance  Radha  Date:    11/03/2022  Time:    04:15    Electronically signed by: Abad Garza MD  Date:    11/03/2022  Time:    04:44               Narrative:    EXAMINATION:  MRI ANKLE W WO CONTRAST RIGHT    CLINICAL HISTORY:  Soft tissue infection suspected, ankle, xray done;eval for retrocalcaneal abscess;    TECHNIQUE:  MRI right ankle performed per routine protocol without contrast.    COMPARISON:  Right foot and ankle radiograph 11/02/2022    FINDINGS:  Emergent MRI right ankle with without contrast for evaluation of infection.    Bones:  No fracture or infiltrative process.    Joints: No joint effusions. Tibiotalar and subtalar joints are maintained.    Soft tissues: No enhancing fluid collections in the soft tissues.  Subcutaneous soft tissue edema about the ankle, most pronounced laterally.                                       X-Ray Foot Complete Right (Final result)  Result time 11/03/22 00:16:32      Final result by Harshad Valdez DO (11/03/22 00:16:32)                   Impression:      No acute osseous abnormality of the right ankle or foot.      Electronically signed by: Harshad Valdez  Date:    11/03/2022  Time:    00:16               Narrative:    EXAMINATION:  XR ANKLE COMPLETE 3 VIEW RIGHT; XR FOOT COMPLETE 3 VIEW RIGHT    CLINICAL HISTORY:  Pain in right ankle and joints of right foot; Pain in right foot    TECHNIQUE:  AP, lateral, and oblique images of the right ankle were performed.    AP, lateral, and oblique images of the right foot were performed.    COMPARISON:  None    FINDINGS:  Right ankle: No acute fracture or dislocation. Alignment is normal. The ankle mortise is congruent. The talar dome is intact. Joint spaces are preserved. No effusion.    Right foot: No acute fracture or dislocation. Alignment is normal. The Lisfranc articulation is congruent. Joint spaces are preserved.                                       X-Ray Ankle Complete Right (Final result)  Result time 11/03/22 00:16:32       Final result by Harshad Valdez DO (11/03/22 00:16:32)                   Impression:      No acute osseous abnormality of the right ankle or foot.      Electronically signed by: Harshad Valdez  Date:    11/03/2022  Time:    00:16               Narrative:    EXAMINATION:  XR ANKLE COMPLETE 3 VIEW RIGHT; XR FOOT COMPLETE 3 VIEW RIGHT    CLINICAL HISTORY:  Pain in right ankle and joints of right foot; Pain in right foot    TECHNIQUE:  AP, lateral, and oblique images of the right ankle were performed.    AP, lateral, and oblique images of the right foot were performed.    COMPARISON:  None    FINDINGS:  Right ankle: No acute fracture or dislocation. Alignment is normal. The ankle mortise is congruent. The talar dome is intact. Joint spaces are preserved. No effusion.    Right foot: No acute fracture or dislocation. Alignment is normal. The Lisfranc articulation is congruent. Joint spaces are preserved.                                       Medications   sodium chloride 0.9% flush 5 mL (has no administration in time range)   albuterol-ipratropium 2.5 mg-0.5 mg/3 mL nebulizer solution 3 mL (has no administration in time range)   melatonin tablet 6 mg (has no administration in time range)   ondansetron disintegrating tablet 8 mg (has no administration in time range)   prochlorperazine injection Soln 5 mg (has no administration in time range)   polyethylene glycol packet 17 g (17 g Oral Given 11/4/22 9258)   bisacodyL suppository 10 mg (has no administration in time range)   simethicone chewable tablet 80 mg (has no administration in time range)   aluminum-magnesium hydroxide-simethicone 200-200-20 mg/5 mL suspension 30 mL (has no administration in time range)   acetaminophen tablet 650 mg (has no administration in time range)   acetaminophen tablet 1,000 mg (1,000 mg Oral Given 11/4/22 1168)   naloxone 0.4 mg/mL injection 0.02 mg (has no administration in time range)   glucose chewable tablet 16 g (has no administration  in time range)   glucose chewable tablet 24 g (has no administration in time range)   glucagon (human recombinant) injection 1 mg (has no administration in time range)   nicotine 14 mg/24 hr 1 patch (1 patch Transdermal Patch Applied 11/4/22 0841)   dextrose 10% bolus 125 mL (has no administration in time range)   dextrose 10% bolus 250 mL (has no administration in time range)   enoxaparin injection 40 mg (40 mg Subcutaneous Not Given 11/4/22 1637)   dicyclomine tablet 20 mg (has no administration in time range)   loperamide capsule 2 mg (has no administration in time range)   methocarbamoL tablet 500 mg (has no administration in time range)   hydrOXYzine pamoate capsule 50 mg (has no administration in time range)   ibuprofen tablet 400 mg (has no administration in time range)   cefTRIAXone (ROCEPHIN) 2 g/50 mL D5W IVPB (0 g Intravenous Stopped 11/4/22 0950)   vancomycin - pharmacy to dose (has no administration in time range)   Lactobacillus rhamnosus GG capsule 1 capsule (1 capsule Oral Given 11/4/22 0841)   vancomycin 1.75 g in 5 % dextrose 500 mL IVPB (0 mg Intravenous Stopped 11/3/22 1402)     Followed by   vancomycin 1.5 g in dextrose 5 % 250 mL IVPB (ready to mix) (1,500 mg Intravenous Trough Due As Scheduled Before Dose 11/4/22 2030)   thiamine tablet 100 mg (100 mg Oral Given 11/4/22 0841)   folic acid tablet 1 mg (1 mg Oral Given 11/4/22 0841)   multivitamin tablet (1 tablet Oral Given 11/4/22 0841)   morphine injection 4 mg (4 mg Intravenous Given 11/3/22 0211)   diphenhydrAMINE injection 25 mg (25 mg Intravenous Given 11/3/22 0222)   gadobutroL (GADAVIST) injection 9 mL (9 mLs Intravenous Given 11/3/22 0342)   clindamycin in D5W 600 mg/50 mL IVPB 600 mg (0 mg Intravenous Stopped 11/3/22 0443)     Medical Decision Making:   History:   Old Medical Records: I decided to obtain old medical records.  Old Records Summarized: records from clinic visits and records from previous admission(s).  Differential  Diagnosis:   Cellulitis, abscess, retained foreign body, septic joint, gout  Clinical Tests:   Lab Tests: Ordered and Reviewed  Radiological Study: Ordered and Reviewed  ED Management:  Started on iv abx for cellulitis to R ankle and foot  Difficulty ambulating so will admit  Ortho recommended MRI to rule out abscess  Other:   I have discussed this case with another health care provider.       <> Summary of the Discussion: Ortho consulted out of concern for septic ankle, abscess                        Clinical Impression:   Final diagnoses:  [M25.571] Right ankle pain  [M79.671] Right foot pain  [L03.115] Cellulitis of right ankle (Primary)      ED Disposition Condition    Observation Stable                Tatiana Sandhu MD  11/04/22 6561

## 2022-11-03 NOTE — ASSESSMENT & PLAN NOTE
- On chart review, patient endorses tobacco use  - Unable to discuss cessation with patient as he is unwilling to participate in interview  - Nicotine patch ordered if patient desires

## 2022-11-03 NOTE — NURSING
Quick report received from ED for patient going to [id number].  Patient arrived on unit and is missing 1 black Rangel flip/flop and 1 black sock .  1 of each in the bed with patient the & the other missing.  Transporter stated he did not see the sock or shoe--he just transported the patient.  Charge nurse notified.  Called ED charge to check for patient belongings (on hold for longer  Than 10 minutes. No call back.  Patient also has 1 cell phone, 1 , 1 wallet, 1 hat, 1 jacket, and the clothes on him.

## 2022-11-03 NOTE — DISCHARGE INSTRUCTIONS
REFERRAL RECOMMENDATIONS FOR SUBSTANCE ABUSE & MENTAL HEALTH      IN CASE OF SUICIDAL THINKING, call the National Suicide Hotline Number: 988    988 Suicide & Crisis Lifeline: 745 , 3-323-890-TQGN (3436)  https://988Senexxline.[employer]       SUBSTANCE ABUSE:     SUBOXONE:     NOTE: some Suboxone clinics require their clients to participate in a structured program (such as an IOP) in order to be prescribed Suboxone.  Some clinics have a long waiting list.  Most of these clinics do not accept walk-in clients, so call first to to learn what must be done to get started on Suboxone.    [facility] - [phone] (can do Sublocade)  [address] [facility], [place] [address]    [facility]  [address] [facility], [place]  [phone]    [facility] - [phone] (can do Sublocade)  [address] [place] [address]    [facility]  [address] [place]  [phone]     [facility] (very short waiting list, may accept some walk-in's but call first if possible)  [address], [place] [address]  [phone]; [phone]    [facility]   [address] [place]    [phone]    [facility] (can usually be seen within a week but is cash only for appointment)  [address] [place], [facility]  [address] [place], [place]  [phone]    [facility] ([facility])  [address] [place] [address] [place] [address]  [phone]      METHADONE:     [facility] (the only methadone clinic in the [facility], has two locations)  [x] [address] [address], [phone]  [x] [facility] [address] [place] [address], [phone]      12 STEP PROGRAMS (and similar):     Alcoholics Anonymous (local)  [x] [phone]  [x] www.[place].org for schedules for in-person and online meetings  [x] There are AA meetings throughout the day all over [facility]  [x] AA costs nothing to attend; they pass a basket for donations but this is not  required    Narcotics Anonymous  [x] 180.791.1832  [x] www.noana.org  [x] There are NA meetings throughout the day all over town  [x] NA costs nothing to attend; they pass a basket for donations but this is not required    Alcoholics Anonymous Online Intergroup (national)  [x] www.aa-intergroup.org  [x] Good resource for large, nation-wide meetings  [x] Can also attend smaller, local meetings in other cities  [x] Countless meetings all day and all night  [x] AA costs nothing to attend; they pass a basket for donations but this is not required    LOOKING FOR AN ALTERNATIVE TO 12 STEP PROGRAMS - check out:  [facility]: https://www.smartrecovery.org/about-us  [facility]: https://recoverydharma.org      DETOX UNITS (USUALLY 5-7 DAYS):     [facility]: [address], [facility]  [phone], call first to ensure bed availability    [facility]: [address] [place]  [phone], Option 1, call first to ensure bed availability    [facility]: [address] [place]  [phone] (intake by appointment only)    [facility]: [address] [place]  [phone]      INTENSIVE OUTPATIENT PROGRAMS:     [facility] Intensive Outpatient Program  [x] [phone]  [x] [address] [facility] (the clinic not on [facility])  [x] Call number above for more info and to check insurance requirements    [facility]  [address] [place] [address] [phone]    [facility]:  [address] [facility], [address] [place], [place] [address]?, [phone]  [address] [facility]?, [place], [place] [address]?, [phone]    RESIDENTIAL REHABS (USUALLY 28 DAYS):     [facility]: [address] [facility], [phone]    [facility]: [address] [place]  [phone] (intake by appointment only)    [facility] (men only) [address] [place] [address], [phone]    [facility] (Female only) [address], [place],  [phone]    [facility]: [address] [place], men's program [phone], women's program [phone]    [facility]: [address], [place], [phone]    [facility]: [address], [place], [place], [phone]    [facility]: Men only, [phone], [address] [facility]: [address], [place], [place], [phone]    [facility]: [address] [place], [place],  [phone]  New Location: [address] [place] [address] [address], [place] [address], [phone]    [facility]:   ?[address]?[place], [place] [address]?[phone]    [place]: [address] [place] [address], [phone]    [place]: [place], [place], [phone]     [facility]: [place], [place], [phone]    [facility]: [place], [place], [phone]    [facility]: [place], [place], [phone]    [place]: [place], [phone]    [facility]: [address] [place] [address], [phone]    COMMUNITY ADDICTION CLINICS:     [facility]: [address] [facility], [address] [place], [phone] -or- [address] [place] [address]    [facility] [place]: [address], [place]  [phone]     [facility]: Clinics [phone]; Crisis [phone]    [facility]: [address] [place] [address]    [facility]: [address] [place] [address]    [facility]: [address] [place] [address],    [facility]: 2nd Floor [address] [facility] [place] [address]    [facility]: [address], [facility] [place] [address]    [facility], [address], [place] [address]    [facility]: [address] [place] [address] [phone]  (serves youth 16-23 years old)    [facility]: [place]/St. Anaya/Raeann/Nate/[place] [phone]    [facility]: [address] [facility], [facility] [phone]    [facility]: [address] [facility] [phone]    [facility]: [address] [facility], [address], [phone]     [facility]: [address] [facility] [phone], [phone]      [facility] MENTAL HEALTH CENTERS:     [facility]  ([facility])  Serves [facility].  Serves uninsured patients & those with Medicaid.  Main location: [address] [place] [address]  [phone]  Walk-in's available during regular business hours.  24/7 Crisis Line: [phone]    [facility]  (aka [facility])  Serves [facility].  Serves uninsured patients, those with Medicaid and some private plans.  Walk-in's available during regular business hours.  Primary care services available as well.  [facility]: [address] [place] [address];  [phone]  [place]: [address] [place] [address];  [phone]  24/7 Crisis Line: [phone]    [facility]  Serves uninsured patients & those with Medicaid, call for more info.  Primary care, pediatrics, HIV treatment, and dentistry services available as well.  Three locations.  [phone]    [facility] [place]  Serves patients with Medicaid, Medicare, and private insurance  [address]  [place] [address]  [phone]    [facility]  Serves uninsured on a sliding scale, as well as Medicaid, Medicare, and private plans.  Eight locations around the [facility] area.  [phone]    [facility]  Serves uninsured patients & those with Medicaid,  private insurances.  Primary care available as well.  [phone]  [address] [place] [address]      MENTAL HEALTH/ADDICTIVE DISORDERS:     AA ([phone]), NA ([phone])   National Suicide Prevention Lifeline- Call 1-114.414.2152 Available 24 hours everyday  [facility] 444-1975; Crisis Line 168-3526 - Call for options A-F:    Intensive Outpatient Treatment/ Day programs   [facility], please contact   [facility], please contact [phone] or [phone] to speak with an admissions counselor.  [facility] (Methadone Maintenance)   [address] [place] [address], [phone]  [address] [place] [address] [phone]  [facility], [address] [address], [phone]  [facility] [phone]  [facility] [place] please contact [phone]  [facility], [address] [facility], 4th [place] [address] Phone: [phone]   [facility]  [place] Office: [address] [address], [phone]  [place] Office: [facility], [address], [place] [address], [phone]  [place] Office: [facility] [place] [address] [phone]    Outpatient Substance Abuse Treatment   [facility] (Methadone Maintenance)   [address] [place] [address], [phone]  [address] [place] [address] [phone]  [facility], [address] [address], [phone]  [facility] Office: [address] [address], [phone]  [place] Office: [facility], [address], [place] [address], [phone]  [place] Office: [address] [place] [address] [phone]  [facility], [address] [place] [address] [phone]   [facility], [address] [facility], [address], [phone]  [place]  [facility], [address] [place], [place] [phone]    Residential Substance Abuse Treatment   [facility] [address] [facility], [phone] or [phone]  [facility], [address] [employer], [phone]  [facility] (men only) [address] [place] [address], [phone]  [facility] (women only) [address] [place] [address] [phone]  [facility], [address] [place] [address] [phone]  [facility] (women only), intakes at [address] [employer], [phone]  [facility] (7-day program, $100, [address], [phone], [phone], [phone])  [facility] (Men only, [phone]), [address], [place] (Vets*/Non-Vets)  [facility] (Men only, $400/month program fee) [address] [facility], [phone]  [facility] (Women over age 39 only), [address] [facility], [phone]      HOTLINES:     VIA LINK  211 or [phone]    Service provided: counselors can provide crisis counseling. Counselors can also provide information and referrals to programs which can help with needs such as food, shelter, medical care, financial assistance, mental health services, substance abuse treatment, senior services, childcare, and more

## 2022-11-03 NOTE — ASSESSMENT & PLAN NOTE
- Hepatitis C ab positive on ED labs  - Unable to discuss with patient as he will not participate   - Hepatitis C RNA ordered

## 2022-11-03 NOTE — ASSESSMENT & PLAN NOTE
42M with no known medical history but with recent IV heroin use with right foot/ankle pain and erythema after attempting to inject heroin to the area. Not participating in exam but does report no pain currently.    - AFVSS. 0/4 SIRS  - WBC 7.6. Sed rate wnl 16. CRP elevated 14. Hepatitis C ab reactive  - S/p clindamycin 600 mg IV, benadryl 25 mg IV, morphine 4 mg IV in the ED  - Xray imaging R ankle/foot without fracture.  - Ortho consulted in the ED and performed right ankle joint aspiration no joint fluid obtained.   - MRI R ankle without evidence of soft tissue abscess, osteomyelitis, or septic arthritis. consistent with cellulitis.   - Will continue IV clindamycin   - Pain control prn  - Monitor labs and vitals

## 2022-11-03 NOTE — PROGRESS NOTES
"[facility] ([address] Medicine  Progress Note    Patient Name: Tye Florian III  MRN: [id number]  Patient Class: OP- Observation   Admission Date: 11/2/2022  Length of Stay: 0 days  Attending Physician: Brandon Jacob MD  Primary Care Provider: Primary Doctor No        Subjective:     Principal Problem:Cellulitis of right ankle        HPI:  Tye Florian III is a 43 yo male IV drug user who presents with right ankle pain after injecting IV drugs into right ankle region. The majority of the history is collected from the ED and ortho notes as the patient refused to participate in an interview with me. Per ortho note "Patients states he was trying to inject heroin into his right ankle and thought he put it in a vein. For the past 1 day he been having pain in that ankle that has been getting worse with some associated redness and swelling and pain with weight bearing. When asked to point to the pain he points to his posterolateral ankle. Denies prior injuries or surgeries or infections to the ankle. Denies other MSK pains or paresthesias. Denies fevers or chills." When asked about most recent heroin use and frequency patient states "never" and sleeps through the rest of the exam despite being arousable to verbal stimuli. Denies pain currently.     In the ED, AFVSS. WBC 7.6. Sed rate wnl 16. CRP elevated 14.  Bicarb 20, Cr 0.7. Hepatitis V ab reactive. Ortho consulted and attempted right ankle joint aspiration no joint fluid obtained. Imaging R ankle/foot without fracture. MRI R ankle without evidence of soft tissue abscess, osteomyelitis, or septic arthritis. Subcutaneous soft tissue edema about the ankle, most pronounced laterally. S/p clindamycin 600 mg IV, benadryl 25 mg IV, morphine 4 mg IV. Admitted to  for IV abx.      Overview/Hospital Course:  11/3 s/p Orthopedic surgery eval -CRP mildly elevated 14, ESR  WNL.  Attempted aspiration of right ankle to r/o septic " arthritis at region of no cellulitis using fluroscopy. no joint fluid obtained to send for WBC or culture.  MRI Right  ankle WWO contrast -  No evidence of soft tissue abscess, osteomyelitis, or septic arthritis. Subcutaneous soft tissue edema about the ankle, most pronounced laterally. BC x2 ordered with h/O IVDA. U tox screen.  HCV ab + . LFTs WNL  .addiction psychiatry consulted. No need for inpatient psychiatric hospitalization. Continue symptomatic management of opioid withdrawal . needs  NALOXONE PRESCRIPTION FOR EMERGENCY USE OF OPIOID REVERSAL.  echo -The left ventricle is normal in size with normal systolic function.The estimated ejection fraction is 65%.Normal left ventricular diastolic function.Normal right ventricular size with normal right ventricular systolic function.  The estimated PA systolic pressure is 23 mmHg.Normal central venous pressure (3 mmHg).             Review of Systems:   Pain scale:  7/10   Constitutional:  fever,  chills, headache, vision loss, hearing loss, weight loss, Generalized weakness, falls, loss of smell, loss of taste, poor appetite,  sore throat  Respiratory: cough, shortness of breath.   Cardiovascular: chest pain, dizziness, palpitations, orthopnea, swelling of feet, syncope  Gastrointestinal: nausea, vomiting, abdominal pain, diarrhea, black stool,  blood in stool, change in bowel habits  Genitourinary: hematuria, dysuria, urgency, frequency  Integument/Breast: rash,  pruritis  Hematologic/Lymphatic: easy bruising, lymphadenopathy  Musculoskeletal: arthralgias , myalgias, back pain, neck pain, knee pain  Neurological: confusion, seizures, tremors, slurred speech  Behavioral/Psych:  depression, anxiety, auditory or visual hallucinations     OBJECTIVE:     Physical Exam:  Body mass index is 25.1 kg/m².    Constitutional: Appears well-developed and well-nourished.   Head: Normocephalic and atraumatic.   Neck: Normal range of motion. Neck supple.   Cardiovascular: Normal  heart rate.  Regular heart rhythm.  Pulmonary/Chest: Effort normal.   Abdominal: No distension.  No tenderness  Musculoskeletal: Normal range of motion. No edema.  Right lateral ankle and foot tenderness -  erythema,  swelling  and warmth   Neurological: Alert and oriented to person, place, and time.   Skin: Skin is warm and dry. multiple tattoes noted on extremities  Psychiatric: Normal mood and affect. Behavior is normal.                  Vital Signs  Temp: 98.9 °F (37.2 °C) (11/03/22 1936)  Pulse: 65 (11/03/22 1936)  Resp: 20 (11/03/22 1936)  BP: 115/75 (11/03/22 1936)  SpO2: 97 % (11/03/22 1936)     24 Hour VS Range    Temp:  [97.5 °F (36.4 °C)-98.9 °F (37.2 °C)]   Pulse:  [55-91]   Resp:  [15-20]   BP: (115-133)/(67-79)   SpO2:  [92 %-98 %]     Intake/Output Summary (Last 24 hours) at 11/3/2022 1954  Last data filed at 11/3/2022 0534  Gross per 24 hour   Intake 50 ml   Output no documentation   Net 50 ml         I/O This Shift:  No intake/output data recorded.    Wt Readings from Last 3 Encounters:   11/03/22 81.6 kg (180 lb)   09/30/22 80.7 kg (178 lb)       I have personally reviewed the vitals and recorded Intake/Output     Laboratory/Diagnostic Data:    CBC/Anemia Labs: Coags:    Recent Labs   Lab 11/03/22 0112   WBC 7.66   HGB 13.9*   HCT 40.8      MCV 90   RDW 13.3    No results for input(s): PT, INR, APTT in the last 168 hours.     Chemistries: ABG:   Recent Labs   Lab 11/03/22 0112      K 3.6      CO2 20*   BUN 7   CREATININE 0.7   CALCIUM 8.6*   PROT 7.4   BILITOT 0.4   ALKPHOS 129   ALT 23   AST 28    No results for input(s): PH, PCO2, PO2, HCO3, POCSATURATED, BE in the last 168 hours.     POCT Glucose: HbA1c:    Recent Labs   Lab 11/03/22  0550 11/03/22  1524   POCTGLUCOSE 89 113*    No results found for: HGBA1C     Cardiac Enzymes: Ejection Fractions:    No results for input(s): CPK, CPKMB, MB, TROPONINI in the last 72 hours. EF   Date Value Ref Range Status   11/03/2022 65 %  Final          No results for input(s): COLORU, APPEARANCEUA, PHUR, SPECGRAV, PROTEINUA, GLUCUA, KETONESU, BILIRUBINUA, OCCULTUA, NITRITE, UROBILINOGEN, LEUKOCYTESUR, RBCUA, WBCUA, BACTERIA, SQUAMEPITHEL, HYALINECASTS in the last 48 hours.    Invalid input(s): WRIGHTSUR    No results found for: PROCAL, LACTATE  No results found for: BNP  CRP (mg/L)   Date Value   11/03/2022 14.0 (H)     Sed Rate (mm/Hr)   Date Value   11/03/2022 16     No results found for: DDIMER  No results found for: FERRITIN  No results found for: LDH  No results found for: TROPONINI, CPK  No results found for this or any previous visit.  No results found for: [id number]    Microbiology labs for the last week  Microbiology Results (last 7 days)       Procedure Component Value Units Date/Time    Blood culture [[id number]] Collected: 11/03/22 0857    Order Status: Completed Specimen: Blood from Peripheral, Left Hand Updated: 11/03/22 1715     Blood Culture, Routine No Growth to date    Blood culture [[id number]] Collected: 11/03/22 0857    Order Status: Completed Specimen: Blood Updated: 11/03/22 1715     Blood Culture, Routine No Growth to date    Culture, Anaerobe [[id number]] Collected: 11/03/22 0149    Order Status: Canceled Specimen: Joint Fluid from Ankle, Right     Aerobic culture [[id number]] Collected: 11/03/22 0149    Order Status: Canceled Specimen: Bone from Ankle, Right     AFB Culture & Smear [[id number]] Collected: 11/03/22 0149    Order Status: Canceled Specimen: Joint Fluid from Ankle, Right     Fungus culture [[id number]] Collected: 11/03/22 0149    Order Status: Canceled Specimen: Joint Fluid from Ankle, Right     Gram stain [[id number]] Collected: 11/03/22 0149    Order Status: Canceled Specimen: Joint Fluid from Ankle, Right             Reviewed and noted in plan where applicable- Please see chart for full lab data.    Lines/Drains:       Peripheral IV - Single Lumen 11/03/22 0112 22 G Anterior;Left Wrist (Active)   Site Assessment  Clean;Dry;Intact;No redness;No swelling 11/03/22 0534   Line Status Saline locked 11/03/22 0112   Number of days: 0       Imaging      No results found for this or any previous visit.      Echo  · The left ventricle is normal in size with normal systolic function.  · The estimated ejection fraction is 65%.  · Normal left ventricular diastolic function.  · Normal right ventricular size with normal right ventricular systolic   function.  · The estimated PA systolic pressure is 23 mmHg.  · Normal central venous pressure (3 mmHg).     MRI Ankle W WO Contrast Right  Narrative: EXAMINATION:  MRI ANKLE W WO CONTRAST RIGHT    CLINICAL HISTORY:  Soft tissue infection suspected, ankle, xray done;eval for retrocalcaneal abscess;    TECHNIQUE:  MRI right ankle performed per routine protocol without contrast.    COMPARISON:  Right foot and ankle radiograph 11/02/2022    FINDINGS:  Emergent MRI right ankle with without contrast for evaluation of infection.    Bones:  No fracture or infiltrative process.    Joints: No joint effusions. Tibiotalar and subtalar joints are maintained.    Soft tissues: No enhancing fluid collections in the soft tissues.  Subcutaneous soft tissue edema about the ankle, most pronounced laterally.  Impression: No evidence of soft tissue abscess, osteomyelitis, or septic arthritis.    Subcutaneous soft tissue edema about the ankle, most pronounced laterally.  Correlate clinically for cellulitis in the clinical setting of suspected infection.    Electronically signed by resident: Lance Garcia  Date:    11/03/2022  Time:    04:15    Electronically signed by: Abad Garza MD  Date:    11/03/2022  Time:    04:44  X-Ray Foot Complete Right  Narrative: EXAMINATION:  XR ANKLE COMPLETE 3 VIEW RIGHT; XR FOOT COMPLETE 3 VIEW RIGHT    CLINICAL HISTORY:  Pain in right ankle and joints of right foot; Pain in right foot    TECHNIQUE:  AP, lateral, and oblique images of the right ankle were performed.    AP, lateral,  and oblique images of the right foot were performed.    COMPARISON:  None    FINDINGS:  Right ankle: No acute fracture or dislocation. Alignment is normal. The ankle mortise is congruent. The talar dome is intact. Joint spaces are preserved. No effusion.    Right foot: No acute fracture or dislocation. Alignment is normal. The Lisfranc articulation is congruent. Joint spaces are preserved.  Impression: No acute osseous abnormality of the right ankle or foot.    Electronically signed by: Harshad Valdez  Date:    11/03/2022  Time:    00:16  X-Ray Ankle Complete Right  Narrative: EXAMINATION:  XR ANKLE COMPLETE 3 VIEW RIGHT; XR FOOT COMPLETE 3 VIEW RIGHT    CLINICAL HISTORY:  Pain in right ankle and joints of right foot; Pain in right foot    TECHNIQUE:  AP, lateral, and oblique images of the right ankle were performed.    AP, lateral, and oblique images of the right foot were performed.    COMPARISON:  None    FINDINGS:  Right ankle: No acute fracture or dislocation. Alignment is normal. The ankle mortise is congruent. The talar dome is intact. Joint spaces are preserved. No effusion.    Right foot: No acute fracture or dislocation. Alignment is normal. The Lisfranc articulation is congruent. Joint spaces are preserved.  Impression: No acute osseous abnormality of the right ankle or foot.    Electronically signed by: Harshad Valdez  Date:    11/03/2022  Time:    00:16      Labs, Imaging, EKG and Diagnostic results from 11/3/2022 were reviewed.    Medications:  Medication list was reviewed and changes noted under Assessment/Plan.  No current facility-administered medications on file prior to encounter.     Current Outpatient Medications on File Prior to Encounter   Medication Sig Dispense Refill    acetaminophen (TYLENOL) 500 MG tablet Take 2 tablets (1,000 mg total) by mouth 3 (three) times daily as needed for Pain. 30 tablet 0    ibuprofen (ADVIL,MOTRIN) 200 MG tablet Take 2 tablets (400 mg total) by mouth every 6  (six) hours as needed for Pain. 30 tablet 0    penicillin v potassium (VEETID) 500 MG tablet Take 1 tablet (500 mg total) by mouth every 6 (six) hours. 28 tablet 0     Scheduled Medications:  cefTRIAXone (ROCEPHIN) IVPB, 2 g, Intravenous, Q24H  enoxaparin, 40 mg, Subcutaneous, Daily  folic acid, 1 mg, Oral, Daily  Lactobacillus rhamnosus GG, 1 capsule, Oral, Daily  multivitamin, 1 tablet, Oral, Daily  nicotine, 1 patch, Transdermal, Daily  polyethylene glycol, 17 g, Oral, Daily  thiamine, 100 mg, Oral, Daily  vancomycin (VANCOCIN) IVPB, 1,500 mg, Intravenous, Q12H    PRN: acetaminophen, acetaminophen, albuterol-ipratropium, aluminum-magnesium hydroxide-simethicone, bisacodyL, dextrose 10%, dextrose 10%, dicyclomine, glucagon (human recombinant), glucose, glucose, hydrOXYzine pamoate, ibuprofen, loperamide, melatonin, methocarbamoL, naloxone, ondansetron, prochlorperazine, simethicone, sodium chloride 0.9%, Pharmacy to dose Vancomycin consult **AND** vancomycin - pharmacy to dose  Infusions:   Estimated Creatinine Clearance: 149.4 mL/min (based on SCr of 0.7 mg/dL).    Assessment/Plan:      * Cellulitis of right ankle  42M with no known medical history but with recent IV heroin use with right foot/ankle pain and erythema after attempting to inject heroin to the area. Not participating in exam but does report no pain currently.    - AFVSS. 0/4 SIRS  - WBC 7.6. Sed rate wnl 16. CRP elevated 14. Hepatitis C ab reactive  - S/p clindamycin 600 mg IV, benadryl 25 mg IV, morphine 4 mg IV in the ED  - Xray imaging R ankle/foot without fracture.  - Ortho consulted in the ED and performed right ankle joint aspiration no joint fluid obtained.   - MRI R ankle without evidence of soft tissue abscess, osteomyelitis, or septic arthritis. consistent with cellulitis.   - Will continue IV clindamycin   - Pain control prn  - Monitor labs and vitals  11/3 s/p Orthopedic surgery eval -CRP mildly elevated 14, ESR  WNL.  Attempted  aspiration of right ankle to r/o septic arthritis at region of no cellulitis using fluroscopy. no joint fluid obtained to send for WBC or culture.  MRI Right  ankle WWO contrast -  No evidence of soft tissue abscess, osteomyelitis, or septic arthritis. Subcutaneous soft tissue edema about the ankle, most pronounced laterally. BC x2 ordered  on Ibuprofen 400mg q 6h PRN for pain     Alcohol abuse   alcohol regularly, up to 2x/week, usually 3-4 beers at a time. continue thiamine, folate and MVI      Polysubstance abuse  opioid and cocaine use disorder.  He reports daily IV heroin .       Tobacco use disorder  - On chart review, patient endorses tobacco use  - Unable to discuss cessation with patient as he is unwilling to participate in interview  - Nicotine patch ordered if patient desires      Hepatitis C antibody positive in blood  - Hepatitis C ab positive on ED labs  - Unable to discuss with patient as he will not participate   - Hepatitis C RNA ordered  11/3  HCV ab + . LFTs WNL .outpatient hepatology follow up    Heroin use  Patient unwilling to participate in initital interview. Extent of and last use of heroin unknown. Unable to calculate COWS.    - IVFs prn for volume support  - patient is able to protect airway  - VSS  - Narcan PRN   - aspiration precautions   - telemetry   - neuro checks, vital signs Q4H    For opiate withdrawall:  -dicyclomine 20 mg q6hr PRN abdominal muscle cramps  -loperamide 2 mg q3hr PRN diarrhea  -methocarbamol 500 mg q6hr PRN muscle spasm  -ibuprofen 400 mg q6hr PRN pain  -zofran 4mg q8hr PRN nausea or phenergan 12.5 mg q8hr PRN nausea  -vistaril 50mg q8hr PRN anxiety  11/3  h/O IVDA. U tox screen. addiction psychiatry consulted . .addiction psychiatry consulted. No need for inpatient psychiatric hospitalization. Continue symptomatic management of opioid withdrawal . needs  NALOXONE PRESCRIPTION FOR EMERGENCY USE OF OPIOID REVERSAL.       VTE Risk Mitigation (From admission, onward)            Ordered     enoxaparin injection 40 mg  Daily         11/03/22 0528     IP VTE HIGH RISK PATIENT  Once         11/03/22 0528     Place sequential compression device  Until discontinued         11/03/22 0528                    Discharge Planning   MOMO:      Code Status: Full Code   Is the patient medically ready for discharge?: No    Reason for patient still in hospital (select all that apply): Treatment  Discharge Plan A: Home                  Brandon Jacob MD  Department of Hospital Medicine   [facility] - Intensive Care ([address])

## 2022-11-03 NOTE — ASSESSMENT & PLAN NOTE
- Hepatitis C ab positive on ED labs  - Unable to discuss with patient as he will not participate   - Hepatitis C RNA ordered  11/3  HCV ab + . LFTs WNL .outpatient hepatology follow up

## 2022-11-03 NOTE — H&P
"[facility] ([address] Medicine  History & Physical    Patient Name: Tye Florian III  MRN: [id number]  Patient Class: OP- Observation  Admission Date: 11/2/2022  Attending Physician: Brandon Jacob MD   Primary Care Provider: Primary Doctor No         Patient information was obtained from ER records and ortho records.     Subjective:     Principal Problem:Cellulitis of right ankle    Chief Complaint:   Chief Complaint   Patient presents with    Ankle Pain     Injected heroin into R ankle yesterday, red and swollen, tender to palpation. Denies drug use today. +ETOH 4 hours ago        HPI: Tye Florian III is a 43 yo male IV drug user who presents with right ankle pain after injecting IV drugs into right ankle region. The majority of the history is collected from the ED and ortho notes as the patient refused to participate in an interview with me. Per ortho note "Patients states he was trying to inject heroin into his right ankle and thought he put it in a vein. For the past 1 day he been having pain in that ankle that has been getting worse with some associated redness and swelling and pain with weight bearing. When asked to point to the pain he points to his posterolateral ankle. Denies prior injuries or surgeries or infections to the ankle. Denies other MSK pains or paresthesias. Denies fevers or chills." When asked about most recent heroin use and frequency patient states "never" and sleeps through the rest of the exam despite being arousable to verbal stimuli. Denies pain currently.     In the ED, AFVSS. WBC 7.6. Sed rate wnl 16. CRP elevated 14.  Bicarb 20, Cr 0.7. Hepatitis V ab reactive. Ortho consulted and attempted right ankle joint aspiration no joint fluid obtained. Imaging R ankle/foot without fracture. MRI R ankle without evidence of soft tissue abscess, osteomyelitis, or septic arthritis. Subcutaneous soft tissue edema about the ankle, most pronounced " laterally. S/p clindamycin 600 mg IV, benadryl 25 mg IV, morphine 4 mg IV. Admitted to  for IV abx.      History reviewed. No pertinent past medical history.    No past surgical history on file.    Review of patient's allergies indicates:  No Known Allergies    No current facility-administered medications on file prior to encounter.     Current Outpatient Medications on File Prior to Encounter   Medication Sig    acetaminophen (TYLENOL) 500 MG tablet Take 2 tablets (1,000 mg total) by mouth 3 (three) times daily as needed for Pain.    ibuprofen (ADVIL,MOTRIN) 200 MG tablet Take 2 tablets (400 mg total) by mouth every 6 (six) hours as needed for Pain.    penicillin v potassium (VEETID) 500 MG tablet Take 1 tablet (500 mg total) by mouth every 6 (six) hours.     Family History    None       Tobacco Use    Smoking status: Every Day     Types: Cigarettes    Smokeless tobacco: Never   Substance and Sexual Activity    Alcohol use: Yes    Drug use: Yes     Types: IV    Sexual activity: Not on file     Review of Systems   Unable to perform ROS: Other (Patient refuses to participate)   Objective:     Vital Signs (Most Recent):  Temp: 97.5 °F (36.4 °C) (11/03/22 0534)  Pulse: 64 (11/03/22 0534)  Resp: 16 (11/03/22 0534)  BP: 133/71 (11/03/22 0534)  SpO2: (!) 93 % (11/03/22 0534)   Vital Signs (24h Range):  Temp:  [97.5 °F (36.4 °C)-98.8 °F (37.1 °C)] 97.5 °F (36.4 °C)  Pulse:  [64-91] 64  Resp:  [15-20] 16  SpO2:  [93 %-95 %] 93 %  BP: (118-133)/(67-71) 133/71     Weight: 81.6 kg (180 lb)  Body mass index is 25.1 kg/m².    Physical Exam  Vitals and nursing note reviewed.   Constitutional:       General: He is not in acute distress.     Appearance: He is well-developed.      Comments: Sleepy but opens eyes to voice and follows some commands/answers a few questions but then closes eyes and stops participating.    HENT:      Head: Normocephalic and atraumatic.      Nose: Nose normal.      Mouth/Throat:      Pharynx:  Oropharynx is clear.   Eyes:      Extraocular Movements: Extraocular movements intact.      Conjunctiva/sclera: Conjunctivae normal.      Comments: Pupils 3 mm OU, sluggishly reactive to light   Cardiovascular:      Rate and Rhythm: Normal rate and regular rhythm.      Pulses: Normal pulses.      Heart sounds: Normal heart sounds.   Pulmonary:      Effort: Pulmonary effort is normal.      Comments: Difficult to auscultate fully as patient will not move or take deep breaths   Abdominal:      General: Bowel sounds are normal. There is no distension.      Palpations: Abdomen is soft.      Tenderness: There is no abdominal tenderness.   Musculoskeletal:         General: Swelling and tenderness present. Normal range of motion.      Cervical back: Normal range of motion and neck supple. No tenderness.   Skin:     General: Skin is warm and dry.      Capillary Refill: Capillary refill takes less than 2 seconds.      Findings: Erythema present. No rash.      Comments: L lateral ankle/foot with erythema, mild swelling, tenderness, and warmth   See image from ED below   Neurological:      General: No focal deficit present.      Comments: Will not answer orientations questions  Difficult neuro exam given lack of participation  No neuro deficits identified in limited neuro exam   Psychiatric:      Comments: Refuses to participate in exam              Significant Labs: All pertinent labs within the past 24 hours have been reviewed.  CBC:   Recent Labs   Lab 11/03/22  0112   WBC 7.66   HGB 13.9*   HCT 40.8        CMP:   Recent Labs   Lab 11/03/22  0112      K 3.6      CO2 20*   GLU 75   BUN 7   CREATININE 0.7   CALCIUM 8.6*   PROT 7.4   ALBUMIN 3.8   BILITOT 0.4   ALKPHOS 129   AST 28   ALT 23   ANIONGAP 13       Significant Imaging: I have reviewed all pertinent imaging results/findings within the past 24 hours.  MRI Ankle W WO Contrast Right  Narrative: EXAMINATION:  MRI ANKLE W WO CONTRAST RIGHT    CLINICAL  HISTORY:  Soft tissue infection suspected, ankle, xray done;eval for retrocalcaneal abscess;    TECHNIQUE:  MRI right ankle performed per routine protocol without contrast.    COMPARISON:  Right foot and ankle radiograph 11/02/2022    FINDINGS:  Emergent MRI right ankle with without contrast for evaluation of infection.    Bones:  No fracture or infiltrative process.    Joints: No joint effusions. Tibiotalar and subtalar joints are maintained.    Soft tissues: No enhancing fluid collections in the soft tissues.  Subcutaneous soft tissue edema about the ankle, most pronounced laterally.  Impression: No evidence of soft tissue abscess, osteomyelitis, or septic arthritis.    Subcutaneous soft tissue edema about the ankle, most pronounced laterally.  Correlate clinically for cellulitis in the clinical setting of suspected infection.    Electronically signed by resident: Lance Garcia  Date:    11/03/2022  Time:    04:15    Electronically signed by: Abad Garza MD  Date:    11/03/2022  Time:    04:44  X-Ray Foot Complete Right  Narrative: EXAMINATION:  XR ANKLE COMPLETE 3 VIEW RIGHT; XR FOOT COMPLETE 3 VIEW RIGHT    CLINICAL HISTORY:  Pain in right ankle and joints of right foot; Pain in right foot    TECHNIQUE:  AP, lateral, and oblique images of the right ankle were performed.    AP, lateral, and oblique images of the right foot were performed.    COMPARISON:  None    FINDINGS:  Right ankle: No acute fracture or dislocation. Alignment is normal. The ankle mortise is congruent. The talar dome is intact. Joint spaces are preserved. No effusion.    Right foot: No acute fracture or dislocation. Alignment is normal. The Lisfranc articulation is congruent. Joint spaces are preserved.  Impression: No acute osseous abnormality of the right ankle or foot.    Electronically signed by: Harshad Valdez  Date:    11/03/2022  Time:    00:16  X-Ray Ankle Complete Right  Narrative: EXAMINATION:  XR ANKLE COMPLETE 3 VIEW RIGHT; XR FOOT  COMPLETE 3 VIEW RIGHT    CLINICAL HISTORY:  Pain in right ankle and joints of right foot; Pain in right foot    TECHNIQUE:  AP, lateral, and oblique images of the right ankle were performed.    AP, lateral, and oblique images of the right foot were performed.    COMPARISON:  None    FINDINGS:  Right ankle: No acute fracture or dislocation. Alignment is normal. The ankle mortise is congruent. The talar dome is intact. Joint spaces are preserved. No effusion.    Right foot: No acute fracture or dislocation. Alignment is normal. The Lisfranc articulation is congruent. Joint spaces are preserved.  Impression: No acute osseous abnormality of the right ankle or foot.    Electronically signed by: Harshad Valdez  Date:    11/03/2022  Time:    00:16     Assessment/Plan:     * Cellulitis of right ankle  42M with no known medical history but with recent IV heroin use with right foot/ankle pain and erythema after attempting to inject heroin to the area. Not participating in exam but does report no pain currently.    - AFVSS. 0/4 SIRS  - WBC 7.6. Sed rate wnl 16. CRP elevated 14. Hepatitis C ab reactive  - S/p clindamycin 600 mg IV, benadryl 25 mg IV, morphine 4 mg IV in the ED  - Xray imaging R ankle/foot without fracture.  - Ortho consulted in the ED and performed right ankle joint aspiration no joint fluid obtained.   - MRI R ankle without evidence of soft tissue abscess, osteomyelitis, or septic arthritis. consistent with cellulitis.   - Will continue IV clindamycin   - Pain control prn  - Monitor labs and vitals    Tobacco use  - On chart review, patient endorses tobacco use  - Unable to discuss cessation with patient as he is unwilling to participate in interview  - Nicotine patch ordered if patient desires    Hepatitis C antibody positive in blood  - Hepatitis C ab positive on ED labs  - Unable to discuss with patient as he will not participate   - Hepatitis C RNA ordered    Heroin use  Patient unwilling to participate in  initital interview. Extent of and last use of heroin unknown. Unable to calculate COWS.    - IVFs prn for volume support  - patient is able to protect airway  - VSS  - Narcan PRN   - aspiration precautions   - telemetry   - neuro checks, vital signs Q4H    For opiate withdrawall:  -dicyclomine 20 mg q6hr PRN abdominal muscle cramps  -loperamide 2 mg q3hr PRN diarrhea  -methocarbamol 500 mg q6hr PRN muscle spasm  -ibuprofen 400 mg q6hr PRN pain  -zofran 4mg q8hr PRN nausea or phenergan 12.5 mg q8hr PRN nausea  -vistaril 50mg q8hr PRN anxiety      VTE Risk Mitigation (From admission, onward)         Ordered     enoxaparin injection 40 mg  Daily         11/03/22 0528     IP VTE HIGH RISK PATIENT  Once         11/03/22 0528     Place sequential compression device  Until discontinued         11/03/22 0528                   BRANDIN LinC  Department of Hospital Medicine   [facility] - Intensive Care ([place])

## 2022-11-03 NOTE — ASSESSMENT & PLAN NOTE
alcohol regularly, up to 2x/week, usually 3-4 beers at a time. continue thiamine, folate and MVI

## 2022-11-03 NOTE — ASSESSMENT & PLAN NOTE
42M with no known medical history but with recent IV heroin use with right foot/ankle pain and erythema after attempting to inject heroin to the area. Not participating in exam but does report no pain currently.    - AFVSS. 0/4 SIRS  - WBC 7.6. Sed rate wnl 16. CRP elevated 14. Hepatitis C ab reactive  - S/p clindamycin 600 mg IV, benadryl 25 mg IV, morphine 4 mg IV in the ED  - Xray imaging R ankle/foot without fracture.  - Ortho consulted in the ED and performed right ankle joint aspiration no joint fluid obtained.   - MRI R ankle without evidence of soft tissue abscess, osteomyelitis, or septic arthritis. consistent with cellulitis.   - Will continue IV clindamycin   - Pain control prn  - Monitor labs and vitals  11/3 s/p Orthopedic surgery eval -CRP mildly elevated 14, ESR  WNL.  Attempted aspiration of right ankle to r/o septic arthritis at region of no cellulitis using fluroscopy. no joint fluid obtained to send for WBC or culture.  MRI Right  ankle WWO contrast -  No evidence of soft tissue abscess, osteomyelitis, or septic arthritis. Subcutaneous soft tissue edema about the ankle, most pronounced laterally. BC x2 ordered  on Ibuprofen 400mg q 6h PRN for pain

## 2022-11-03 NOTE — HOSPITAL COURSE
11/3 s/p Orthopedic surgery eval -CRP mildly elevated 14, ESR  WNL.  Attempted aspiration of right ankle to r/o septic arthritis at region of no cellulitis using fluroscopy. no joint fluid obtained to send for WBC or culture.  MRI Right  ankle WWO contrast -  No evidence of soft tissue abscess, osteomyelitis, or septic arthritis. Subcutaneous soft tissue edema about the ankle, most pronounced laterally. BC x2 ordered with h/O IVDA. U tox screen.  HCV ab + . LFTs WNL  .addiction psychiatry consulted. No need for inpatient psychiatric hospitalization. Continue symptomatic management of opioid withdrawal . needs  NALOXONE PRESCRIPTION FOR EMERGENCY USE OF OPIOID REVERSAL.  echo -The left ventricle is normal in size with normal systolic function.The estimated ejection fraction is 65%.Normal left ventricular diastolic function.Normal right ventricular size with normal right ventricular systolic function.  The estimated PA systolic pressure is 23 mmHg.Normal central venous pressure (3 mmHg).  11/4 BCx 2 pending. continue ceftriaxone and vancomycin. monitor for vancomycin toxicity . reports improved ankle pain   11/5 BCX 2 11/4 NGTD. vanc trough not drawn ovenright and vancomycin not given . started on oral doxycycline for 7 days . discussed with addiction psychiatry -For the indication of addiction Methadone can only be dispensed by a federally licensed methadone maintenance clinic. addiction pscyhiatry f/u  outpatient for MAT. discharge home today

## 2022-11-03 NOTE — SUBJECTIVE & OBJECTIVE
History reviewed. No pertinent past medical history.    No past surgical history on file.    Review of patient's allergies indicates:  No Known Allergies    No current facility-administered medications on file prior to encounter.     Current Outpatient Medications on File Prior to Encounter   Medication Sig    acetaminophen (TYLENOL) 500 MG tablet Take 2 tablets (1,000 mg total) by mouth 3 (three) times daily as needed for Pain.    ibuprofen (ADVIL,MOTRIN) 200 MG tablet Take 2 tablets (400 mg total) by mouth every 6 (six) hours as needed for Pain.    penicillin v potassium (VEETID) 500 MG tablet Take 1 tablet (500 mg total) by mouth every 6 (six) hours.     Family History    None       Tobacco Use    Smoking status: Every Day     Types: Cigarettes    Smokeless tobacco: Never   Substance and Sexual Activity    Alcohol use: Yes    Drug use: Yes     Types: IV    Sexual activity: Not on file     Review of Systems   Unable to perform ROS: Other (Patient refuses to participate)   Objective:     Vital Signs (Most Recent):  Temp: 97.5 °F (36.4 °C) (11/03/22 0534)  Pulse: 64 (11/03/22 0534)  Resp: 16 (11/03/22 0534)  BP: 133/71 (11/03/22 0534)  SpO2: (!) 93 % (11/03/22 0534)   Vital Signs (24h Range):  Temp:  [97.5 °F (36.4 °C)-98.8 °F (37.1 °C)] 97.5 °F (36.4 °C)  Pulse:  [64-91] 64  Resp:  [15-20] 16  SpO2:  [93 %-95 %] 93 %  BP: (118-133)/(67-71) 133/71     Weight: 81.6 kg (180 lb)  Body mass index is 25.1 kg/m².    Physical Exam  Vitals and nursing note reviewed.   Constitutional:       General: He is not in acute distress.     Appearance: He is well-developed.      Comments: Sleepy but opens eyes to voice and follows some commands/answers a few questions but then closes eyes and stops participating.    HENT:      Head: Normocephalic and atraumatic.      Nose: Nose normal.      Mouth/Throat:      Pharynx: Oropharynx is clear.   Eyes:      Extraocular Movements: Extraocular movements intact.      Conjunctiva/sclera:  Conjunctivae normal.      Comments: Pupils 3 mm OU, sluggishly reactive to light   Cardiovascular:      Rate and Rhythm: Normal rate and regular rhythm.      Pulses: Normal pulses.      Heart sounds: Normal heart sounds.   Pulmonary:      Effort: Pulmonary effort is normal.      Comments: Difficult to auscultate fully as patient will not move or take deep breaths   Abdominal:      General: Bowel sounds are normal. There is no distension.      Palpations: Abdomen is soft.      Tenderness: There is no abdominal tenderness.   Musculoskeletal:         General: Swelling and tenderness present. Normal range of motion.      Cervical back: Normal range of motion and neck supple. No tenderness.   Skin:     General: Skin is warm and dry.      Capillary Refill: Capillary refill takes less than 2 seconds.      Findings: Erythema present. No rash.      Comments: L lateral ankle/foot with erythema, mild swelling, tenderness, and warmth   See image from ED below   Neurological:      General: No focal deficit present.      Comments: Will not answer orientations questions  Difficult neuro exam given lack of participation  No neuro deficits identified in limited neuro exam   Psychiatric:      Comments: Refuses to participate in exam              Significant Labs: All pertinent labs within the past 24 hours have been reviewed.  CBC:   Recent Labs   Lab 11/03/22  0112   WBC 7.66   HGB 13.9*   HCT 40.8        CMP:   Recent Labs   Lab 11/03/22 0112      K 3.6      CO2 20*   GLU 75   BUN 7   CREATININE 0.7   CALCIUM 8.6*   PROT 7.4   ALBUMIN 3.8   BILITOT 0.4   ALKPHOS 129   AST 28   ALT 23   ANIONGAP 13       Significant Imaging: I have reviewed all pertinent imaging results/findings within the past 24 hours.  MRI Ankle W WO Contrast Right  Narrative: EXAMINATION:  MRI ANKLE W WO CONTRAST RIGHT    CLINICAL HISTORY:  Soft tissue infection suspected, ankle, xray done;eval for retrocalcaneal abscess;    TECHNIQUE:  MRI  right ankle performed per routine protocol without contrast.    COMPARISON:  Right foot and ankle radiograph 11/02/2022    FINDINGS:  Emergent MRI right ankle with without contrast for evaluation of infection.    Bones:  No fracture or infiltrative process.    Joints: No joint effusions. Tibiotalar and subtalar joints are maintained.    Soft tissues: No enhancing fluid collections in the soft tissues.  Subcutaneous soft tissue edema about the ankle, most pronounced laterally.  Impression: No evidence of soft tissue abscess, osteomyelitis, or septic arthritis.    Subcutaneous soft tissue edema about the ankle, most pronounced laterally.  Correlate clinically for cellulitis in the clinical setting of suspected infection.    Electronically signed by resident: Lance Garcia  Date:    11/03/2022  Time:    04:15    Electronically signed by: Abad Garza MD  Date:    11/03/2022  Time:    04:44  X-Ray Foot Complete Right  Narrative: EXAMINATION:  XR ANKLE COMPLETE 3 VIEW RIGHT; XR FOOT COMPLETE 3 VIEW RIGHT    CLINICAL HISTORY:  Pain in right ankle and joints of right foot; Pain in right foot    TECHNIQUE:  AP, lateral, and oblique images of the right ankle were performed.    AP, lateral, and oblique images of the right foot were performed.    COMPARISON:  None    FINDINGS:  Right ankle: No acute fracture or dislocation. Alignment is normal. The ankle mortise is congruent. The talar dome is intact. Joint spaces are preserved. No effusion.    Right foot: No acute fracture or dislocation. Alignment is normal. The Lisfranc articulation is congruent. Joint spaces are preserved.  Impression: No acute osseous abnormality of the right ankle or foot.    Electronically signed by: Harshad Valdez  Date:    11/03/2022  Time:    00:16  X-Ray Ankle Complete Right  Narrative: EXAMINATION:  XR ANKLE COMPLETE 3 VIEW RIGHT; XR FOOT COMPLETE 3 VIEW RIGHT    CLINICAL HISTORY:  Pain in right ankle and joints of right foot; Pain in right  foot    TECHNIQUE:  AP, lateral, and oblique images of the right ankle were performed.    AP, lateral, and oblique images of the right foot were performed.    COMPARISON:  None    FINDINGS:  Right ankle: No acute fracture or dislocation. Alignment is normal. The ankle mortise is congruent. The talar dome is intact. Joint spaces are preserved. No effusion.    Right foot: No acute fracture or dislocation. Alignment is normal. The Lisfranc articulation is congruent. Joint spaces are preserved.  Impression: No acute osseous abnormality of the right ankle or foot.    Electronically signed by: Harshad Valdez  Date:    11/03/2022  Time:    00:16

## 2022-11-03 NOTE — ASSESSMENT & PLAN NOTE
Patient unwilling to participate in initital interview. Extent of and last use of heroin unknown. Unable to calculate COWS.    - IVFs prn for volume support  - patient is able to protect airway  - VSS  - Narcan PRN   - aspiration precautions   - telemetry   - neuro checks, vital signs Q4H    For opiate withdrawall:  -dicyclomine 20 mg q6hr PRN abdominal muscle cramps  -loperamide 2 mg q3hr PRN diarrhea  -methocarbamol 500 mg q6hr PRN muscle spasm  -ibuprofen 400 mg q6hr PRN pain  -zofran 4mg q8hr PRN nausea or phenergan 12.5 mg q8hr PRN nausea  -vistaril 50mg q8hr PRN anxiety  11/3  h/O IVDA. U tox screen. addiction psychiatry consulted . .addiction psychiatry consulted. No need for inpatient psychiatric hospitalization. Continue symptomatic management of opioid withdrawal . needs  NALOXONE PRESCRIPTION FOR EMERGENCY USE OF OPIOID REVERSAL.

## 2022-11-04 LAB
ANION GAP SERPL CALC-SCNC: 10 MMOL/L (ref 8–16)
BASOPHILS # BLD AUTO: 0.03 K/UL (ref 0–0.2)
BASOPHILS NFR BLD: 0.5 % (ref 0–1.9)
BUN SERPL-MCNC: 9 MG/DL (ref 6–20)
CALCIUM SERPL-MCNC: 9.2 MG/DL (ref 8.7–10.5)
CHLORIDE SERPL-SCNC: 107 MMOL/L (ref 95–110)
CO2 SERPL-SCNC: 23 MMOL/L (ref 23–29)
CREAT SERPL-MCNC: 0.8 MG/DL (ref 0.5–1.4)
DIFFERENTIAL METHOD: NORMAL
EOSINOPHIL # BLD AUTO: 0.3 K/UL (ref 0–0.5)
EOSINOPHIL NFR BLD: 4.7 % (ref 0–8)
ERYTHROCYTE [DISTWIDTH] IN BLOOD BY AUTOMATED COUNT: 13.2 % (ref 11.5–14.5)
EST. GFR  (NO RACE VARIABLE): >60 ML/MIN/1.73 M^2
GLUCOSE SERPL-MCNC: 98 MG/DL (ref 70–110)
HCT VFR BLD AUTO: 44 % (ref 40–54)
HCV RNA SERPL NAA+PROBE-ACNC: ABNORMAL IU/ML
HCV RNA SERPL NAA+PROBE-LOG IU: 5.49 LOGIU/ML
HCV RNA SERPL NAA+PROBE-LOG IU: 5.77 LOGIU/ML
HCV RNA SERPL QL NAA+PROBE: DETECTED
HCV RNA SERPL QL NAA+PROBE: DETECTED
HCV RNA SPEC NAA+PROBE-ACNC: ABNORMAL IU/ML
HCV RNA SPEC NAA+PROBE-ACNC: ABNORMAL IU/ML
HGB BLD-MCNC: 14.8 G/DL (ref 14–18)
IMM GRANULOCYTES # BLD AUTO: 0.02 K/UL (ref 0–0.04)
IMM GRANULOCYTES NFR BLD AUTO: 0.3 % (ref 0–0.5)
LYMPHOCYTES # BLD AUTO: 1.7 K/UL (ref 1–4.8)
LYMPHOCYTES NFR BLD: 26.5 % (ref 18–48)
MCH RBC QN AUTO: 30.8 PG (ref 27–31)
MCHC RBC AUTO-ENTMCNC: 33.6 G/DL (ref 32–36)
MCV RBC AUTO: 92 FL (ref 82–98)
MONOCYTES # BLD AUTO: 0.5 K/UL (ref 0.3–1)
MONOCYTES NFR BLD: 7.4 % (ref 4–15)
NEUTROPHILS # BLD AUTO: 3.9 K/UL (ref 1.8–7.7)
NEUTROPHILS NFR BLD: 60.6 % (ref 38–73)
NRBC BLD-RTO: 0 /100 WBC
PLATELET # BLD AUTO: 197 K/UL (ref 150–450)
PMV BLD AUTO: 10.1 FL (ref 9.2–12.9)
POCT GLUCOSE: 144 MG/DL (ref 70–110)
POCT GLUCOSE: 160 MG/DL (ref 70–110)
POCT GLUCOSE: 82 MG/DL (ref 70–110)
POTASSIUM SERPL-SCNC: 3.5 MMOL/L (ref 3.5–5.1)
RBC # BLD AUTO: 4.8 M/UL (ref 4.6–6.2)
SODIUM SERPL-SCNC: 140 MMOL/L (ref 136–145)
WBC # BLD AUTO: 6.37 K/UL (ref 3.9–12.7)

## 2022-11-04 PROCEDURE — 25000003 PHARM REV CODE 250: Performed by: HOSPITALIST

## 2022-11-04 PROCEDURE — 99225 PR SUBSEQUENT OBSERVATION CARE,LEVEL II: ICD-10-PCS | Mod: AF,HB,, | Performed by: PSYCHIATRY & NEUROLOGY

## 2022-11-04 PROCEDURE — 80048 BASIC METABOLIC PNL TOTAL CA: CPT

## 2022-11-04 PROCEDURE — S4991 NICOTINE PATCH NONLEGEND: HCPCS

## 2022-11-04 PROCEDURE — 87522 HEPATITIS C REVRS TRNSCRPJ: CPT | Performed by: HOSPITALIST

## 2022-11-04 PROCEDURE — 36415 COLL VENOUS BLD VENIPUNCTURE: CPT | Performed by: HOSPITALIST

## 2022-11-04 PROCEDURE — 99226 PR SUBSEQUENT OBSERVATION CARE,LEVEL III: CPT | Mod: ,,, | Performed by: HOSPITALIST

## 2022-11-04 PROCEDURE — 25000003 PHARM REV CODE 250

## 2022-11-04 PROCEDURE — 99225 PR SUBSEQUENT OBSERVATION CARE,LEVEL II: CPT | Mod: AF,HB,, | Performed by: PSYCHIATRY & NEUROLOGY

## 2022-11-04 PROCEDURE — G0378 HOSPITAL OBSERVATION PER HR: HCPCS

## 2022-11-04 PROCEDURE — 99226 PR SUBSEQUENT OBSERVATION CARE,LEVEL III: ICD-10-PCS | Mod: ,,, | Performed by: HOSPITALIST

## 2022-11-04 PROCEDURE — 63600175 PHARM REV CODE 636 W HCPCS: Performed by: HOSPITALIST

## 2022-11-04 PROCEDURE — 85025 COMPLETE CBC W/AUTO DIFF WBC: CPT

## 2022-11-04 PROCEDURE — 96366 THER/PROPH/DIAG IV INF ADDON: CPT

## 2022-11-04 RX ADMIN — Medication 100 MG: at 08:11

## 2022-11-04 RX ADMIN — POLYETHYLENE GLYCOL 3350 17 G: 17 POWDER, FOR SOLUTION ORAL at 08:11

## 2022-11-04 RX ADMIN — THERA TABS 1 TABLET: TAB at 08:11

## 2022-11-04 RX ADMIN — FOLIC ACID 1 MG: 1 TABLET ORAL at 08:11

## 2022-11-04 RX ADMIN — CEFTRIAXONE 2 G: 2 INJECTION, SOLUTION INTRAVENOUS at 08:11

## 2022-11-04 RX ADMIN — VANCOMYCIN HYDROCHLORIDE 1500 MG: 1.5 INJECTION, POWDER, LYOPHILIZED, FOR SOLUTION INTRAVENOUS at 10:11

## 2022-11-04 RX ADMIN — Medication 1 CAPSULE: at 08:11

## 2022-11-04 RX ADMIN — ACETAMINOPHEN 1000 MG: 500 TABLET ORAL at 06:11

## 2022-11-04 RX ADMIN — NICOTINE 1 PATCH: 14 PATCH, EXTENDED RELEASE TRANSDERMAL at 08:11

## 2022-11-04 NOTE — PLAN OF CARE
Problem: Violence Risk or Actual  Goal: Anger and Impulse Control  Outcome: Ongoing, Progressing     Problem: Adult Inpatient Plan of Care  Goal: Plan of Care Review  Outcome: Ongoing, Progressing     Problem: Infection  Goal: Absence of Infection Signs and Symptoms  Outcome: Ongoing, Progressing     Problem: Fall Injury Risk  Goal: Absence of Fall and Fall-Related Injury  Outcome: Ongoing, Progressing

## 2022-11-04 NOTE — PLAN OF CARE
Problem: Adult Inpatient Plan of Care  Goal: Plan of Care Review  Outcome: Ongoing, Progressing  Goal: Patient-Specific Goal (Individualized)  Outcome: Ongoing, Progressing  Goal: Absence of Hospital-Acquired Illness or Injury  Outcome: Ongoing, Progressing  Goal: Optimal Comfort and Wellbeing  Outcome: Ongoing, Progressing  Goal: Readiness for Transition of Care  Outcome: Ongoing, Progressing     Patient AAOX4. Cooperative with care. No acute events over night.

## 2022-11-04 NOTE — PROGRESS NOTES
"[facility] - [facility] ([address] Medicine  Progress Note    Patient Name: Tye Florian III  MRN: [id number]  Patient Class: OP- Observation   Admission Date: 11/2/2022  Length of Stay: 0 days  Attending Physician: Brandon Jacob MD  Primary Care Provider: Primary Doctor No        Subjective:     Principal Problem:Cellulitis of right ankle        HPI:  Tye Florian III is a 41 yo male IV drug user who presents with right ankle pain after injecting IV drugs into right ankle region. The majority of the history is collected from the ED and ortho notes as the patient refused to participate in an interview with me. Per ortho note "Patients states he was trying to inject heroin into his right ankle and thought he put it in a vein. For the past 1 day he been having pain in that ankle that has been getting worse with some associated redness and swelling and pain with weight bearing. When asked to point to the pain he points to his posterolateral ankle. Denies prior injuries or surgeries or infections to the ankle. Denies other MSK pains or paresthesias. Denies fevers or chills." When asked about most recent heroin use and frequency patient states "never" and sleeps through the rest of the exam despite being arousable to verbal stimuli. Denies pain currently.     In the ED, AFVSS. WBC 7.6. Sed rate wnl 16. CRP elevated 14.  Bicarb 20, Cr 0.7. Hepatitis V ab reactive. Ortho consulted and attempted right ankle joint aspiration no joint fluid obtained. Imaging R ankle/foot without fracture. MRI R ankle without evidence of soft tissue abscess, osteomyelitis, or septic arthritis. Subcutaneous soft tissue edema about the ankle, most pronounced laterally. S/p clindamycin 600 mg IV, benadryl 25 mg IV, morphine 4 mg IV. Admitted to  for IV abx.      Overview/Hospital Course:  11/3 s/p Orthopedic surgery eval -CRP mildly elevated 14, ESR  WNL.  Attempted aspiration of right ankle to r/o septic " arthritis at region of no cellulitis using fluroscopy. no joint fluid obtained to send for WBC or culture.  MRI Right  ankle WWO contrast -  No evidence of soft tissue abscess, osteomyelitis, or septic arthritis. Subcutaneous soft tissue edema about the ankle, most pronounced laterally. BC x2 ordered with h/O IVDA. U tox screen.  HCV ab + . LFTs WNL  .addiction psychiatry consulted. No need for inpatient psychiatric hospitalization. Continue symptomatic management of opioid withdrawal . needs  NALOXONE PRESCRIPTION FOR EMERGENCY USE OF OPIOID REVERSAL.  echo -The left ventricle is normal in size with normal systolic function.The estimated ejection fraction is 65%.Normal left ventricular diastolic function.Normal right ventricular size with normal right ventricular systolic function.  The estimated PA systolic pressure is 23 mmHg.Normal central venous pressure (3 mmHg).  11/4 BCx 2 pending. continue ceftriaxone and vancomycin. monitor for vancomycin toxicity . reports improved ankle pain              Review of Systems:   Pain scale:  5/10   Constitutional:  fever,  chills, headache, vision loss, hearing loss, weight loss, Generalized weakness, falls, loss of smell, loss of taste, poor appetite,  sore throat  Respiratory: cough, shortness of breath.   Cardiovascular: chest pain, dizziness, palpitations, orthopnea, swelling of feet, syncope  Gastrointestinal: nausea, vomiting, abdominal pain, diarrhea, black stool,  blood in stool, change in bowel habits  Genitourinary: hematuria, dysuria, urgency, frequency  Integument/Breast: rash,  pruritis  Hematologic/Lymphatic: easy bruising, lymphadenopathy  Musculoskeletal: arthralgias , myalgias, back pain, neck pain, knee pain  Neurological: confusion, seizures, tremors, slurred speech  Behavioral/Psych:  depression, anxiety, auditory or visual hallucinations     OBJECTIVE:     Physical Exam:  Body mass index is 25.1 kg/m².    Constitutional: Appears well-developed and  well-nourished.   Head: Normocephalic and atraumatic.   Neck: Normal range of motion. Neck supple.   Cardiovascular: Normal heart rate.  Regular heart rhythm.  Pulmonary/Chest: Effort normal.   Abdominal: No distension.  No tenderness  Musculoskeletal: Normal range of motion. No edema.  Right lateral ankle and foot tenderness -  erythema,  swelling  and warmth   Neurological: Alert and oriented to person, place, and time.   Skin: Skin is warm and dry. multiple tattoes noted on extremities  Psychiatric: Normal mood and affect. Behavior is normal.                  Vital Signs  Temp: 98.2 °F (36.8 °C) (11/04/22 1600)  Pulse: 60 (11/04/22 1600)  Resp: 18 (11/04/22 1600)  BP: 103/68 (11/04/22 1600)  SpO2: 95 % (11/04/22 1600)     24 Hour VS Range    Temp:  [97.4 °F (36.3 °C)-99.2 °F (37.3 °C)]   Pulse:  [60-78]   Resp:  [18-20]   BP: (101-122)/(63-83)   SpO2:  [95 %-99 %]   No intake or output data in the 24 hours ending 11/04/22 1939        I/O This Shift:  No intake/output data recorded.    Wt Readings from Last 3 Encounters:   11/03/22 81.6 kg (180 lb)   09/30/22 80.7 kg (178 lb)       I have personally reviewed the vitals and recorded Intake/Output     Laboratory/Diagnostic Data:    CBC/Anemia Labs: Coags:    Recent Labs   Lab 11/03/22 0112 11/04/22 0512   WBC 7.66 6.37   HGB 13.9* 14.8   HCT 40.8 44.0    197   MCV 90 92   RDW 13.3 13.2    No results for input(s): PT, INR, APTT in the last 168 hours.     Chemistries: ABG:   Recent Labs   Lab 11/03/22 0112 11/04/22 0512    140   K 3.6 3.5    107   CO2 20* 23   BUN 7 9   CREATININE 0.7 0.8   CALCIUM 8.6* 9.2   PROT 7.4  --    BILITOT 0.4  --    ALKPHOS 129  --    ALT 23  --    AST 28  --     No results for input(s): PH, PCO2, PO2, HCO3, POCSATURATED, BE in the last 168 hours.     POCT Glucose: HbA1c:    Recent Labs   Lab 11/03/22  0550 11/03/22  1524 11/04/22  0752 11/04/22  1544   POCTGLUCOSE 89 113* 82 160*    No results found for: HGBA1C      Cardiac Enzymes: Ejection Fractions:    No results for input(s): CPK, CPKMB, MB, TROPONINI in the last 72 hours. EF   Date Value Ref Range Status   11/03/2022 65 % Final          No results for input(s): COLORU, APPEARANCEUA, PHUR, SPECGRAV, PROTEINUA, GLUCUA, KETONESU, BILIRUBINUA, OCCULTUA, NITRITE, UROBILINOGEN, LEUKOCYTESUR, RBCUA, WBCUA, BACTERIA, SQUAMEPITHEL, HYALINECASTS in the last 48 hours.    Invalid input(s): WRIGHTSUR    No results found for: PROCAL, LACTATE  No results found for: BNP  CRP (mg/L)   Date Value   11/03/2022 14.0 (H)     Sed Rate (mm/Hr)   Date Value   11/03/2022 16     No results found for: DDIMER  No results found for: FERRITIN  No results found for: LDH  No results found for: TROPONINI, CPK  No results found for this or any previous visit.  No results found for: [id number]    Microbiology labs for the last week  Microbiology Results (last 7 days)       Procedure Component Value Units Date/Time    Blood culture [[id number]] Collected: 11/03/22 0857    Order Status: Completed Specimen: Blood from Peripheral, Left Hand Updated: 11/04/22 1022     Blood Culture, Routine No Growth to date      No Growth to date    Blood culture [[id number]] Collected: 11/03/22 0857    Order Status: Completed Specimen: Blood Updated: 11/04/22 1022     Blood Culture, Routine No Growth to date      No Growth to date    Culture, Anaerobe [[id number]] Collected: 11/03/22 0149    Order Status: Canceled Specimen: Joint Fluid from Ankle, Right     Aerobic culture [[id number]] Collected: 11/03/22 0149    Order Status: Canceled Specimen: Bone from Ankle, Right     AFB Culture & Smear [[id number]] Collected: 11/03/22 0149    Order Status: Canceled Specimen: Joint Fluid from Ankle, Right     Fungus culture [[id number]] Collected: 11/03/22 0149    Order Status: Canceled Specimen: Joint Fluid from Ankle, Right     Gram stain [[id number]] Collected: 11/03/22 0149    Order Status: Canceled Specimen: Joint Fluid from Ankle,  Right             Reviewed and noted in plan where applicable- Please see chart for full lab data.    Lines/Drains:       Peripheral IV - Single Lumen 11/03/22 0112 22 G Anterior;Left Wrist (Active)   Site Assessment Clean;Dry;Intact;No redness;No swelling 11/03/22 0534   Line Status Saline locked 11/03/22 0112   Number of days: 0       Imaging      No results found for this or any previous visit.      Echo  · The left ventricle is normal in size with normal systolic function.  · The estimated ejection fraction is 65%.  · Normal left ventricular diastolic function.  · Normal right ventricular size with normal right ventricular systolic   function.  · The estimated PA systolic pressure is 23 mmHg.  · Normal central venous pressure (3 mmHg).     MRI Ankle W WO Contrast Right  Narrative: EXAMINATION:  MRI ANKLE W WO CONTRAST RIGHT    CLINICAL HISTORY:  Soft tissue infection suspected, ankle, xray done;eval for retrocalcaneal abscess;    TECHNIQUE:  MRI right ankle performed per routine protocol without contrast.    COMPARISON:  Right foot and ankle radiograph 11/02/2022    FINDINGS:  Emergent MRI right ankle with without contrast for evaluation of infection.    Bones:  No fracture or infiltrative process.    Joints: No joint effusions. Tibiotalar and subtalar joints are maintained.    Soft tissues: No enhancing fluid collections in the soft tissues.  Subcutaneous soft tissue edema about the ankle, most pronounced laterally.  Impression: No evidence of soft tissue abscess, osteomyelitis, or septic arthritis.    Subcutaneous soft tissue edema about the ankle, most pronounced laterally.  Correlate clinically for cellulitis in the clinical setting of suspected infection.    Electronically signed by resident: Lance Garcia  Date:    11/03/2022  Time:    04:15    Electronically signed by: Abad Garza MD  Date:    11/03/2022  Time:    04:44  X-Ray Foot Complete Right  Narrative: EXAMINATION:  XR ANKLE COMPLETE 3 VIEW RIGHT;  XR FOOT COMPLETE 3 VIEW RIGHT    CLINICAL HISTORY:  Pain in right ankle and joints of right foot; Pain in right foot    TECHNIQUE:  AP, lateral, and oblique images of the right ankle were performed.    AP, lateral, and oblique images of the right foot were performed.    COMPARISON:  None    FINDINGS:  Right ankle: No acute fracture or dislocation. Alignment is normal. The ankle mortise is congruent. The talar dome is intact. Joint spaces are preserved. No effusion.    Right foot: No acute fracture or dislocation. Alignment is normal. The Lisfranc articulation is congruent. Joint spaces are preserved.  Impression: No acute osseous abnormality of the right ankle or foot.    Electronically signed by: Harshad Valdez  Date:    11/03/2022  Time:    00:16  X-Ray Ankle Complete Right  Narrative: EXAMINATION:  XR ANKLE COMPLETE 3 VIEW RIGHT; XR FOOT COMPLETE 3 VIEW RIGHT    CLINICAL HISTORY:  Pain in right ankle and joints of right foot; Pain in right foot    TECHNIQUE:  AP, lateral, and oblique images of the right ankle were performed.    AP, lateral, and oblique images of the right foot were performed.    COMPARISON:  None    FINDINGS:  Right ankle: No acute fracture or dislocation. Alignment is normal. The ankle mortise is congruent. The talar dome is intact. Joint spaces are preserved. No effusion.    Right foot: No acute fracture or dislocation. Alignment is normal. The Lisfranc articulation is congruent. Joint spaces are preserved.  Impression: No acute osseous abnormality of the right ankle or foot.    Electronically signed by: Harshad Valdez  Date:    11/03/2022  Time:    00:16      Labs, Imaging, EKG and Diagnostic results from 11/4/2022 were reviewed.    Medications:  Medication list was reviewed and changes noted under Assessment/Plan.  No current facility-administered medications on file prior to encounter.     Current Outpatient Medications on File Prior to Encounter   Medication Sig Dispense Refill     acetaminophen (TYLENOL) 500 MG tablet Take 2 tablets (1,000 mg total) by mouth 3 (three) times daily as needed for Pain. 30 tablet 0    ibuprofen (ADVIL,MOTRIN) 200 MG tablet Take 2 tablets (400 mg total) by mouth every 6 (six) hours as needed for Pain. 30 tablet 0    penicillin v potassium (VEETID) 500 MG tablet Take 1 tablet (500 mg total) by mouth every 6 (six) hours. 28 tablet 0     Scheduled Medications:  cefTRIAXone (ROCEPHIN) IVPB, 2 g, Intravenous, Q24H  enoxaparin, 40 mg, Subcutaneous, Daily  folic acid, 1 mg, Oral, Daily  Lactobacillus rhamnosus GG, 1 capsule, Oral, Daily  multivitamin, 1 tablet, Oral, Daily  nicotine, 1 patch, Transdermal, Daily  polyethylene glycol, 17 g, Oral, Daily  thiamine, 100 mg, Oral, Daily  vancomycin (VANCOCIN) IVPB, 1,500 mg, Intravenous, Q12H    PRN: acetaminophen, acetaminophen, albuterol-ipratropium, aluminum-magnesium hydroxide-simethicone, bisacodyL, dextrose 10%, dextrose 10%, dicyclomine, glucagon (human recombinant), glucose, glucose, hydrOXYzine pamoate, ibuprofen, loperamide, melatonin, methocarbamoL, naloxone, ondansetron, prochlorperazine, simethicone, sodium chloride 0.9%, Pharmacy to dose Vancomycin consult **AND** vancomycin - pharmacy to dose  Infusions:   Estimated Creatinine Clearance: 130.7 mL/min (based on SCr of 0.8 mg/dL).    Assessment/Plan:      * Cellulitis of right ankle  42M with no known medical history but with recent IV heroin use with right foot/ankle pain and erythema after attempting to inject heroin to the area. Not participating in exam but does report no pain currently.    - AFVSS. 0/4 SIRS  - WBC 7.6. Sed rate wnl 16. CRP elevated 14. Hepatitis C ab reactive  - S/p clindamycin 600 mg IV, benadryl 25 mg IV, morphine 4 mg IV in the ED  - Xray imaging R ankle/foot without fracture.  - Ortho consulted in the ED and performed right ankle joint aspiration no joint fluid obtained.   - MRI R ankle without evidence of soft tissue abscess,  osteomyelitis, or septic arthritis. consistent with cellulitis.   - Will continue IV clindamycin   - Pain control prn  - Monitor labs and vitals  11/3 s/p Orthopedic surgery eval -CRP mildly elevated 14, ESR  WNL.  Attempted aspiration of right ankle to r/o septic arthritis at region of no cellulitis using fluroscopy. no joint fluid obtained to send for WBC or culture.  MRI Right  ankle WWO contrast -  No evidence of soft tissue abscess, osteomyelitis, or septic arthritis. Subcutaneous soft tissue edema about the ankle, most pronounced laterally. BC x2 ordered  on Ibuprofen 400mg q 6h PRN for pain   11/4 BCx 2 pending. continue ceftriaxone and vancomycin. monitor for vancomycin toxicity     Alcohol abuse   alcohol regularly, up to 2x/week, usually 3-4 beers at a time. continue thiamine, folate and MVI      Polysubstance abuse  opioid and cocaine use disorder.  He reports daily IV heroin .       Tobacco use disorder  - On chart review, patient endorses tobacco use  - Unable to discuss cessation with patient as he is unwilling to participate in interview  - Nicotine patch ordered if patient desires      Hepatitis C antibody positive in blood  - Hepatitis C ab positive on ED labs  - Unable to discuss with patient as he will not participate   - Hepatitis C RNA ordered  11/3  HCV ab + . LFTs WNL .outpatient hepatology follow up    Heroin use  Patient unwilling to participate in initital interview. Extent of and last use of heroin unknown. Unable to calculate COWS.    - IVFs prn for volume support  - patient is able to protect airway  - VSS  - Narcan PRN   - aspiration precautions   - telemetry   - neuro checks, vital signs Q4H    For opiate withdrawall:  -dicyclomine 20 mg q6hr PRN abdominal muscle cramps  -loperamide 2 mg q3hr PRN diarrhea  -methocarbamol 500 mg q6hr PRN muscle spasm  -ibuprofen 400 mg q6hr PRN pain  -zofran 4mg q8hr PRN nausea or phenergan 12.5 mg q8hr PRN nausea  -vistaril 50mg q8hr PRN anxiety  11/3   h/O IVDA. U tox screen. addiction psychiatry consulted . .addiction psychiatry consulted. No need for inpatient psychiatric hospitalization. Continue symptomatic management of opioid withdrawal . needs  NALOXONE PRESCRIPTION FOR EMERGENCY USE OF OPIOID REVERSAL.       VTE Risk Mitigation (From admission, onward)           Ordered     enoxaparin injection 40 mg  Daily         11/03/22 0528     IP VTE HIGH RISK PATIENT  Once         11/03/22 0528     Place sequential compression device  Until discontinued         11/03/22 0528                    Discharge Planning   MOMO:      Code Status: Full Code   Is the patient medically ready for discharge?: No    Reason for patient still in hospital (select all that apply): Treatment  Discharge Plan A: Home                  Brandon Jacob MD  Department of Hospital Medicine   [facility] - Intensive Care ([place])

## 2022-11-04 NOTE — PROGRESS NOTES
ADDICTION CONSULT FOLLOW UP VISIT     DEPARTMENT:  Psychiatry  SITE: [facility], [address]    DATE OF ADMISSION: 11/2/2022 11:01 PM  LENGTH OF STAY: 0 days    EXAMINING PRACTITIONER: Kathy Clancy      SUBJECTIVE:     HISTORY    Patient Name: Tye Florian III  YOB: 1980    CHIEF COMPLAINT   Tye Florian III is a 42 y.o. male who is being seen today for a follow up visit by the addiction psychiatry consult service.  Tye Florian III presents with the chief complaint of: alcohol/drug addiction    HPI & PSYCHIATRIC ROS  Patient was seen and interviewed at the bedside today. Reports feeling well with improvement in ankle pain and swelling. Denied new symptoms of withdrawal including N/V/D, diaphoresis, rhinorrhea, myalgias. Also denies depressed mood, anxiety, insomnia. He continues to endorse motivation to abstain from heroin, but has decided he is not interested in inpatient rehab or medication-assisted treatment for his substance use disorder. He does not want to rely on a daily medication and does not want to stop working for 30 days. He was interested in seeing an outpatient Addiction counselor in the future and was again oriented to list of outpatient substance use resources.      PFSH: The patient's past medical history has been reviewed and updated as appropriate within the electronic medical record system.    ROS:  Complete review of systems performed covering Constitutional, Eyes, ENT/Mouth, Cardiovascular, Respiratory, Gastrointestinal, Genitourinary, Musculoskeletal, Skin, Neurologic, Endocrine, Heme/Lymph, and Allergy/Immune.      Complete review of systems was negative with the exception of the following positive symptoms: R ankle pain, improving      PSYCHOTROPIC MEDICATIONS   None      OBJECTIVE:     EXAMINATION    Vitals:    11/03/22 2306 11/04/22 0458 11/04/22 0800 11/04/22 1145   BP: 122/68 112/63 118/83 101/66   BP Location:   Right arm  "Right arm   Patient Position: Lying Lying Lying Lying   Pulse: 65 60 78 62   Resp: 20  18 18   Temp: 98.4 °F (36.9 °C) 97.4 °F (36.3 °C) 97.6 °F (36.4 °C) 99.2 °F (37.3 °C)   TempSrc: Oral Oral Oral Oral   SpO2: 95% 99% 96% 95%   Weight:       Height:           CONSTITUTIONAL  General Appearance: calm, well-nourished, decreasing erythema/edema to R ankle    MUSCULOSKELETAL  Abnormal Involuntary Movements: none observed    PSYCHIATRIC   General Appearance: adequately groomed, appropriately dressed, in no apparent distress, heavily tattooed    Behavior: cooperative, under good behavioral control, minimally engaged    Involuntary Movements and Motor Activity: +tremors    Gait and Station: unable to assess - patient lying down or seated with R ankle injury    Speech: normal rate, rhythm, volume, tone and pitch    Language: fluent, speaks and understands English proficiently    Mood: "alright"  Affect: constricted, apathetic    Thought Process: linear and goal-directed    Associations: intact, no loosening of associations    Thought Content and Perceptions: no suicidal or homicidal ideation, no evidence of psychosis    Sensorium and Orientation: drowsy    Recent and Remote Memory: grossly intact    Attention and Concentration: attentive, not readily distractible    Fund of Knowledge: grossly intact, used appropriate vocabulary, no significant deficits noted    Insight: intact, demonstrates awareness of illness    Judgment: intact, behavior is adequate/appropriate given the circumstances        ASSESSMENT:     DIAGNOSES & PROBLEMS    Opioid use disorder, severe  Tobacco use disorder, severe  Alcohol use disorder, mild  Cocaine use disorder, mild    Patient Active Problem List   Diagnosis    Cellulitis of right ankle    Heroin use    Hepatitis C antibody positive in blood    Tobacco use disorder    Opioid use disorder, severe, dependence    Cocaine use disorder, moderate, dependence    IVDU (intravenous drug user)    " Polysubstance abuse    Alcohol abuse         PLAN:       MANAGEMENT PLAN, TREATMENT GOALS, THERAPEUTIC TECHNIQUES/APPROACHES & CLINICAL REASONING    - Disposition: Once medically cleared; No need for inpatient psychiatric hospitalization, disposition per primary treatment team.  - Contingent on accessibility and willingness, patient would benefit from the following referrals: addiction rehab program, mutual self-help groups (e.g. Alcoholics Anonymous, [employer]), and buprenorphine MAT provider. Given that he has refused IPR and MAT referrals, we will sign off. He has been provided with resources at the bedside and in the discharge instructions.  - Continue opioid withdrawal protocol while patient is in house, including supportive measures,frequent monitoring of vitals and COWS, and use of PRN clonidine if hemodynamically stable.  -Continue symptomatic management of opioid withdrawal with PRN Acetaminophen, Maalox, Dulcolax, Bentyl, Vistaril, Ibuprofen, Lomodil, Melatonin, Robaxin, Zofran, Compazine, Simethicone  - Patient counseled on abstinence from alcohol and substances of abuse (illicit and prescription).  - Counseled on full engagement in 12 step (or equivalent) recovery program(s), including acquisition of a sponsor.  - UPON DISCHARGE, PROVIDE PATIENT WITH NALOXONE PRESCRIPTION FOR EMERGENCY USE OF OPIOID REVERSAL.  If naloxone is utilized, patient instructed to seek immediate medical assistance and call 911.      In cases of emergency, daily coverage provided by Acute/ER Psych MD, NP, or KENNETH, with contact numbers located in [facility] On Call Schedule    Case discussed with staff addiction psychiatrist: NORIS SNOG MD      LABS/IMAGING/STUDIES     Recent Labs   Lab 11/04/22  0512   WBC 6.37   RBC 4.80   HGB 14.8   HCT 44.0      MCV 92   MCH 30.8   MCHC 33.6     BMP WNL today    Hep C Quantitative PCR: [id number]    POCT Glucose @ 0752: 82

## 2022-11-04 NOTE — ASSESSMENT & PLAN NOTE
42M with no known medical history but with recent IV heroin use with right foot/ankle pain and erythema after attempting to inject heroin to the area. Not participating in exam but does report no pain currently.    - AFVSS. 0/4 SIRS  - WBC 7.6. Sed rate wnl 16. CRP elevated 14. Hepatitis C ab reactive  - S/p clindamycin 600 mg IV, benadryl 25 mg IV, morphine 4 mg IV in the ED  - Xray imaging R ankle/foot without fracture.  - Ortho consulted in the ED and performed right ankle joint aspiration no joint fluid obtained.   - MRI R ankle without evidence of soft tissue abscess, osteomyelitis, or septic arthritis. consistent with cellulitis.   - Will continue IV clindamycin   - Pain control prn  - Monitor labs and vitals  11/3 s/p Orthopedic surgery eval -CRP mildly elevated 14, ESR  WNL.  Attempted aspiration of right ankle to r/o septic arthritis at region of no cellulitis using fluroscopy. no joint fluid obtained to send for WBC or culture.  MRI Right  ankle WWO contrast -  No evidence of soft tissue abscess, osteomyelitis, or septic arthritis. Subcutaneous soft tissue edema about the ankle, most pronounced laterally. BC x2 ordered  on Ibuprofen 400mg q 6h PRN for pain   11/4 BCx 2 pending. continue ceftriaxone and vancomycin. monitor for vancomycin toxicity   11/5 BCX 2 11/4 NGTD. vanc trough not drawn ovenright and vancomycin not given . started on oral doxycycline for 7 days .

## 2022-11-05 VITALS
HEART RATE: 68 BPM | BODY MASS INDEX: 25.2 KG/M2 | TEMPERATURE: 98 F | WEIGHT: 180 LBS | SYSTOLIC BLOOD PRESSURE: 131 MMHG | DIASTOLIC BLOOD PRESSURE: 81 MMHG | OXYGEN SATURATION: 100 % | RESPIRATION RATE: 18 BRPM | HEIGHT: 71 IN

## 2022-11-05 PROCEDURE — 99225 PR SUBSEQUENT OBSERVATION CARE,LEVEL II: CPT | Mod: ,,, | Performed by: HOSPITALIST

## 2022-11-05 PROCEDURE — 99225 PR SUBSEQUENT OBSERVATION CARE,LEVEL II: ICD-10-PCS | Mod: ,,, | Performed by: HOSPITALIST

## 2022-11-05 PROCEDURE — G0378 HOSPITAL OBSERVATION PER HR: HCPCS

## 2022-11-05 RX ORDER — ACETAMINOPHEN 500 MG
1000 TABLET ORAL 3 TIMES DAILY PRN
Qty: 60 TABLET | Refills: 0 | Status: SHIPPED | OUTPATIENT
Start: 2022-11-05

## 2022-11-05 RX ORDER — METHOCARBAMOL 500 MG/1
500 TABLET, FILM COATED ORAL EVERY 6 HOURS PRN
Qty: 30 TABLET | Refills: 0 | Status: SHIPPED | OUTPATIENT
Start: 2022-11-05 | End: 2022-11-16

## 2022-11-05 RX ORDER — LOPERAMIDE HYDROCHLORIDE 2 MG/1
2 CAPSULE ORAL 4 TIMES DAILY PRN
Qty: 40 CAPSULE | Refills: 0 | Status: SHIPPED | OUTPATIENT
Start: 2022-11-05 | End: 2022-11-18

## 2022-11-05 RX ORDER — FOLIC ACID 1 MG/1
1 TABLET ORAL DAILY
Qty: 30 TABLET | Refills: 2 | Status: SHIPPED | OUTPATIENT
Start: 2022-11-05 | End: 2023-11-05

## 2022-11-05 RX ORDER — DOXYCYCLINE HYCLATE 100 MG
100 TABLET ORAL EVERY 12 HOURS
Status: DISCONTINUED | OUTPATIENT
Start: 2022-11-05 | End: 2022-11-05 | Stop reason: HOSPADM

## 2022-11-05 RX ORDER — LANOLIN ALCOHOL/MO/W.PET/CERES
100 CREAM (GRAM) TOPICAL DAILY
Qty: 30 TABLET | Refills: 2 | Status: SHIPPED | OUTPATIENT
Start: 2022-11-05

## 2022-11-05 RX ORDER — IBUPROFEN 200 MG
1 TABLET ORAL DAILY
Qty: 28 PATCH | Refills: 2 | Status: SHIPPED | OUTPATIENT
Start: 2022-11-05

## 2022-11-05 RX ORDER — ONDANSETRON 4 MG/1
4 TABLET, FILM COATED ORAL EVERY 6 HOURS PRN
Qty: 28 TABLET | Refills: 0 | Status: SHIPPED | OUTPATIENT
Start: 2022-11-05

## 2022-11-05 RX ORDER — MULTIVITAMIN,THERAPEUTIC
1 TABLET ORAL DAILY
Qty: 30 TABLET | Refills: 2 | Status: SHIPPED | OUTPATIENT
Start: 2022-11-05

## 2022-11-05 RX ORDER — DOXYCYCLINE 100 MG/1
100 CAPSULE ORAL EVERY 12 HOURS
Qty: 14 CAPSULE | Refills: 0 | Status: SHIPPED | OUTPATIENT
Start: 2022-11-05 | End: 2022-11-15

## 2022-11-05 NOTE — PROGRESS NOTES
"[facility] - [facility] ([address] Medicine  Progress Note    Patient Name: Tye Florian III  MRN: [id number]  Patient Class: OP- Observation   Admission Date: 11/2/2022  Length of Stay: 0 days  Attending Physician: Brandon Jacob MD  Primary Care Provider: Primary Doctor No        Subjective:     Principal Problem:Cellulitis of right ankle        HPI:  Tye Florian III is a 41 yo male IV drug user who presents with right ankle pain after injecting IV drugs into right ankle region. The majority of the history is collected from the ED and ortho notes as the patient refused to participate in an interview with me. Per ortho note "Patients states he was trying to inject heroin into his right ankle and thought he put it in a vein. For the past 1 day he been having pain in that ankle that has been getting worse with some associated redness and swelling and pain with weight bearing. When asked to point to the pain he points to his posterolateral ankle. Denies prior injuries or surgeries or infections to the ankle. Denies other MSK pains or paresthesias. Denies fevers or chills." When asked about most recent heroin use and frequency patient states "never" and sleeps through the rest of the exam despite being arousable to verbal stimuli. Denies pain currently.     In the ED, AFVSS. WBC 7.6. Sed rate wnl 16. CRP elevated 14.  Bicarb 20, Cr 0.7. Hepatitis V ab reactive. Ortho consulted and attempted right ankle joint aspiration no joint fluid obtained. Imaging R ankle/foot without fracture. MRI R ankle without evidence of soft tissue abscess, osteomyelitis, or septic arthritis. Subcutaneous soft tissue edema about the ankle, most pronounced laterally. S/p clindamycin 600 mg IV, benadryl 25 mg IV, morphine 4 mg IV. Admitted to  for IV abx.      Overview/Hospital Course:  11/3 s/p Orthopedic surgery eval -CRP mildly elevated 14, ESR  WNL.  Attempted aspiration of right ankle to r/o septic " arthritis at region of no cellulitis using fluroscopy. no joint fluid obtained to send for WBC or culture.  MRI Right  ankle WWO contrast -  No evidence of soft tissue abscess, osteomyelitis, or septic arthritis. Subcutaneous soft tissue edema about the ankle, most pronounced laterally. BC x2 ordered with h/O IVDA. U tox screen.  HCV ab + . LFTs WNL  .addiction psychiatry consulted. No need for inpatient psychiatric hospitalization. Continue symptomatic management of opioid withdrawal . needs  NALOXONE PRESCRIPTION FOR EMERGENCY USE OF OPIOID REVERSAL.  echo -The left ventricle is normal in size with normal systolic function.The estimated ejection fraction is 65%.Normal left ventricular diastolic function.Normal right ventricular size with normal right ventricular systolic function.  The estimated PA systolic pressure is 23 mmHg.Normal central venous pressure (3 mmHg).  11/4 BCx 2 pending. continue ceftriaxone and vancomycin. monitor for vancomycin toxicity . reports improved ankle pain   11/5 BCX 2 11/4 NGTD. vanc trough not drawn ovenright and vancomycin not given . started on oral doxycycline for 7 days . discussed with addiction psychiatry -For the indication of addiction Methadone can only be dispensed by a federally licensed methadone maintenance clinic. addiction pscyhiatry f/u  outpatient for MAT. discharge home today              Review of Systems:   Pain scale:  5/10   Constitutional:  fever,  chills, headache, vision loss, hearing loss, weight loss, Generalized weakness, falls, loss of smell, loss of taste, poor appetite,  sore throat  Respiratory: cough, shortness of breath.   Cardiovascular: chest pain, dizziness, palpitations, orthopnea, swelling of feet, syncope  Gastrointestinal: nausea, vomiting, abdominal pain, diarrhea, black stool,  blood in stool, change in bowel habits  Genitourinary: hematuria, dysuria, urgency, frequency  Integument/Breast: rash,  pruritis  Hematologic/Lymphatic: easy  bruising, lymphadenopathy  Musculoskeletal: arthralgias , myalgias, back pain, neck pain, knee pain  Neurological: confusion, seizures, tremors, slurred speech  Behavioral/Psych:  depression, anxiety, auditory or visual hallucinations     OBJECTIVE:     Physical Exam:  Body mass index is 25.1 kg/m².    Constitutional: Appears well-developed and well-nourished.   Head: Normocephalic and atraumatic.   Neck: Normal range of motion. Neck supple.   Cardiovascular: Normal heart rate.  Regular heart rhythm.  Pulmonary/Chest: Effort normal.   Abdominal: No distension.  No tenderness  Musculoskeletal: Normal range of motion. No edema.  Right lateral ankle and foot tenderness -  erythema,  swelling  and warmth   Neurological: Alert and oriented to person, place, and time.   Skin: Skin is warm and dry. multiple tattoes noted on extremities  Psychiatric: Normal mood and affect. Behavior is normal.                  Vital Signs  Temp: 97.7 °F (36.5 °C) (11/05/22 0445)  Pulse: 79 (11/05/22 0445)  Resp: (Abnormal) 21 (11/05/22 0445)  BP: (Abnormal) 130/93 (11/05/22 0445)  SpO2: 98 % (11/05/22 0445)     24 Hour VS Range    Temp:  [97.6 °F (36.4 °C)-99.2 °F (37.3 °C)]   Pulse:  [60-80]   Resp:  [18-21]   BP: (101-130)/(66-93)   SpO2:  [95 %-98 %]   No intake or output data in the 24 hours ending 11/05/22 0744        I/O This Shift:  No intake/output data recorded.    Wt Readings from Last 3 Encounters:   11/03/22 81.6 kg (180 lb)   09/30/22 80.7 kg (178 lb)       I have personally reviewed the vitals and recorded Intake/Output     Laboratory/Diagnostic Data:    CBC/Anemia Labs: Coags:    Recent Labs   Lab 11/03/22  0112 11/04/22  0512   WBC 7.66 6.37   HGB 13.9* 14.8   HCT 40.8 44.0    197   MCV 90 92   RDW 13.3 13.2    No results for input(s): PT, INR, APTT in the last 168 hours.     Chemistries: ABG:   Recent Labs   Lab 11/03/22  0112 11/04/22  0512    140   K 3.6 3.5    107   CO2 20* 23   BUN 7 9   CREATININE  0.7 0.8   CALCIUM 8.6* 9.2   PROT 7.4  --    BILITOT 0.4  --    ALKPHOS 129  --    ALT 23  --    AST 28  --     No results for input(s): PH, PCO2, PO2, HCO3, POCSATURATED, BE in the last 168 hours.     POCT Glucose: HbA1c:    Recent Labs   Lab 11/03/22  0550 11/03/22  1524 11/04/22  0752 11/04/22  1544 11/04/22 2000   POCTGLUCOSE 89 113* 82 160* 144*    No results found for: HGBA1C     Cardiac Enzymes: Ejection Fractions:    No results for input(s): CPK, CPKMB, MB, TROPONINI in the last 72 hours. EF   Date Value Ref Range Status   11/03/2022 65 % Final          No results for input(s): COLORU, APPEARANCEUA, PHUR, SPECGRAV, PROTEINUA, GLUCUA, KETONESU, BILIRUBINUA, OCCULTUA, NITRITE, UROBILINOGEN, LEUKOCYTESUR, RBCUA, WBCUA, BACTERIA, SQUAMEPITHEL, HYALINECASTS in the last 48 hours.    Invalid input(s): WRIGHTSUR    No results found for: PROCAL, LACTATE  No results found for: BNP  CRP (mg/L)   Date Value   11/03/2022 14.0 (H)     Sed Rate (mm/Hr)   Date Value   11/03/2022 16     No results found for: DDIMER  No results found for: FERRITIN  No results found for: LDH  No results found for: TROPONINI, CPK  No results found for this or any previous visit.  No results found for: [id number]    Microbiology labs for the last week  Microbiology Results (last 7 days)       Procedure Component Value Units Date/Time    Blood culture [[id number]] Collected: 11/03/22 0857    Order Status: Completed Specimen: Blood from Peripheral, Left Hand Updated: 11/04/22 1022     Blood Culture, Routine No Growth to date      No Growth to date    Blood culture [[id number]] Collected: 11/03/22 0857    Order Status: Completed Specimen: Blood Updated: 11/04/22 1022     Blood Culture, Routine No Growth to date      No Growth to date    Culture, Anaerobe [[id number]] Collected: 11/03/22 0149    Order Status: Canceled Specimen: Joint Fluid from Ankle, Right     Aerobic culture [[id number]] Collected: 11/03/22 0149    Order Status: Canceled Specimen:  Bone from Ankle, Right     AFB Culture & Smear [[id number]] Collected: 11/03/22 0149    Order Status: Canceled Specimen: Joint Fluid from Ankle, Right     Fungus culture [[id number]] Collected: 11/03/22 0149    Order Status: Canceled Specimen: Joint Fluid from Ankle, Right     Gram stain [[id number]] Collected: 11/03/22 0149    Order Status: Canceled Specimen: Joint Fluid from Ankle, Right             Reviewed and noted in plan where applicable- Please see chart for full lab data.    Lines/Drains:       Peripheral IV - Single Lumen 11/03/22 0112 22 G Anterior;Left Wrist (Active)   Site Assessment Clean;Dry;Intact;No redness;No swelling 11/03/22 0534   Line Status Saline locked 11/03/22 0112   Number of days: 0       Imaging      No results found for this or any previous visit.      Echo  · The left ventricle is normal in size with normal systolic function.  · The estimated ejection fraction is 65%.  · Normal left ventricular diastolic function.  · Normal right ventricular size with normal right ventricular systolic   function.  · The estimated PA systolic pressure is 23 mmHg.  · Normal central venous pressure (3 mmHg).     MRI Ankle W WO Contrast Right  Narrative: EXAMINATION:  MRI ANKLE W WO CONTRAST RIGHT    CLINICAL HISTORY:  Soft tissue infection suspected, ankle, xray done;eval for retrocalcaneal abscess;    TECHNIQUE:  MRI right ankle performed per routine protocol without contrast.    COMPARISON:  Right foot and ankle radiograph 11/02/2022    FINDINGS:  Emergent MRI right ankle with without contrast for evaluation of infection.    Bones:  No fracture or infiltrative process.    Joints: No joint effusions. Tibiotalar and subtalar joints are maintained.    Soft tissues: No enhancing fluid collections in the soft tissues.  Subcutaneous soft tissue edema about the ankle, most pronounced laterally.  Impression: No evidence of soft tissue abscess, osteomyelitis, or septic arthritis.    Subcutaneous soft tissue edema  about the ankle, most pronounced laterally.  Correlate clinically for cellulitis in the clinical setting of suspected infection.    Electronically signed by resident: Lance Garcia  Date:    11/03/2022  Time:    04:15    Electronically signed by: Abad Garza MD  Date:    11/03/2022  Time:    04:44  X-Ray Foot Complete Right  Narrative: EXAMINATION:  XR ANKLE COMPLETE 3 VIEW RIGHT; XR FOOT COMPLETE 3 VIEW RIGHT    CLINICAL HISTORY:  Pain in right ankle and joints of right foot; Pain in right foot    TECHNIQUE:  AP, lateral, and oblique images of the right ankle were performed.    AP, lateral, and oblique images of the right foot were performed.    COMPARISON:  None    FINDINGS:  Right ankle: No acute fracture or dislocation. Alignment is normal. The ankle mortise is congruent. The talar dome is intact. Joint spaces are preserved. No effusion.    Right foot: No acute fracture or dislocation. Alignment is normal. The Lisfranc articulation is congruent. Joint spaces are preserved.  Impression: No acute osseous abnormality of the right ankle or foot.    Electronically signed by: Harshad Valdez  Date:    11/03/2022  Time:    00:16  X-Ray Ankle Complete Right  Narrative: EXAMINATION:  XR ANKLE COMPLETE 3 VIEW RIGHT; XR FOOT COMPLETE 3 VIEW RIGHT    CLINICAL HISTORY:  Pain in right ankle and joints of right foot; Pain in right foot    TECHNIQUE:  AP, lateral, and oblique images of the right ankle were performed.    AP, lateral, and oblique images of the right foot were performed.    COMPARISON:  None    FINDINGS:  Right ankle: No acute fracture or dislocation. Alignment is normal. The ankle mortise is congruent. The talar dome is intact. Joint spaces are preserved. No effusion.    Right foot: No acute fracture or dislocation. Alignment is normal. The Lisfranc articulation is congruent. Joint spaces are preserved.  Impression: No acute osseous abnormality of the right ankle or foot.    Electronically signed by: Harshad  Courtney  Date:    11/03/2022  Time:    00:16      Labs, Imaging, EKG and Diagnostic results from 11/5/2022 were reviewed.    Medications:  Medication list was reviewed and changes noted under Assessment/Plan.  No current facility-administered medications on file prior to encounter.     Current Outpatient Medications on File Prior to Encounter   Medication Sig Dispense Refill    acetaminophen (TYLENOL) 500 MG tablet Take 2 tablets (1,000 mg total) by mouth 3 (three) times daily as needed for Pain. 30 tablet 0    ibuprofen (ADVIL,MOTRIN) 200 MG tablet Take 2 tablets (400 mg total) by mouth every 6 (six) hours as needed for Pain. 30 tablet 0    penicillin v potassium (VEETID) 500 MG tablet Take 1 tablet (500 mg total) by mouth every 6 (six) hours. 28 tablet 0     Scheduled Medications:  cefTRIAXone (ROCEPHIN) IVPB, 2 g, Intravenous, Q24H  enoxaparin, 40 mg, Subcutaneous, Daily  folic acid, 1 mg, Oral, Daily  Lactobacillus rhamnosus GG, 1 capsule, Oral, Daily  multivitamin, 1 tablet, Oral, Daily  nicotine, 1 patch, Transdermal, Daily  polyethylene glycol, 17 g, Oral, Daily  thiamine, 100 mg, Oral, Daily  vancomycin (VANCOCIN) IVPB, 1,500 mg, Intravenous, Q12H    PRN: acetaminophen, acetaminophen, albuterol-ipratropium, aluminum-magnesium hydroxide-simethicone, bisacodyL, dextrose 10%, dextrose 10%, dicyclomine, glucagon (human recombinant), glucose, glucose, hydrOXYzine pamoate, ibuprofen, loperamide, melatonin, methocarbamoL, naloxone, ondansetron, prochlorperazine, simethicone, sodium chloride 0.9%, Pharmacy to dose Vancomycin consult **AND** vancomycin - pharmacy to dose  Infusions:   Estimated Creatinine Clearance: 130.7 mL/min (based on SCr of 0.8 mg/dL).    Assessment/Plan:      * Cellulitis of right ankle  42M with no known medical history but with recent IV heroin use with right foot/ankle pain and erythema after attempting to inject heroin to the area. Not participating in exam but does report no pain  currently.    - AFVSS. 0/4 SIRS  - WBC 7.6. Sed rate wnl 16. CRP elevated 14. Hepatitis C ab reactive  - S/p clindamycin 600 mg IV, benadryl 25 mg IV, morphine 4 mg IV in the ED  - Xray imaging R ankle/foot without fracture.  - Ortho consulted in the ED and performed right ankle joint aspiration no joint fluid obtained.   - MRI R ankle without evidence of soft tissue abscess, osteomyelitis, or septic arthritis. consistent with cellulitis.   - Will continue IV clindamycin   - Pain control prn  - Monitor labs and vitals  11/3 s/p Orthopedic surgery eval -CRP mildly elevated 14, ESR  WNL.  Attempted aspiration of right ankle to r/o septic arthritis at region of no cellulitis using fluroscopy. no joint fluid obtained to send for WBC or culture.  MRI Right  ankle WWO contrast -  No evidence of soft tissue abscess, osteomyelitis, or septic arthritis. Subcutaneous soft tissue edema about the ankle, most pronounced laterally. BC x2 ordered  on Ibuprofen 400mg q 6h PRN for pain   11/4 BCx 2 pending. continue ceftriaxone and vancomycin. monitor for vancomycin toxicity   11/5 BCX 2 11/4 NGTD. vanc trough not drawn ovenright and vancomycin not given . started on oral doxycycline for 7 days .     Alcohol abuse   alcohol regularly, up to 2x/week, usually 3-4 beers at a time. continue thiamine, folate and MVI      Polysubstance abuse  opioid and cocaine use disorder.  He reports daily IV heroin .       IVDU (intravenous drug user)    11/5 . discussed with addiction psychiatry -For the indication of addiction Methadone can only be dispensed by a federally licensed methadone maintenance clinic. addiction pscyhiatry f/u  outpatient for MAT. discharge home today     Tobacco use disorder  - On chart review, patient endorses tobacco use  - Unable to discuss cessation with patient as he is unwilling to participate in interview  - Nicotine patch ordered if patient desires      Hepatitis C antibody positive in blood  - Hepatitis C ab  positive on ED labs  - Unable to discuss with patient as he will not participate   - Hepatitis C RNA ordered  11/3  HCV ab + . LFTs WNL .outpatient hepatology follow up    Heroin use  Patient unwilling to participate in initital interview. Extent of and last use of heroin unknown. Unable to calculate COWS.    - IVFs prn for volume support  - patient is able to protect airway  - VSS  - Narcan PRN   - aspiration precautions   - telemetry   - neuro checks, vital signs Q4H    For opiate withdrawall:  -dicyclomine 20 mg q6hr PRN abdominal muscle cramps  -loperamide 2 mg q3hr PRN diarrhea  -methocarbamol 500 mg q6hr PRN muscle spasm  -ibuprofen 400 mg q6hr PRN pain  -zofran 4mg q8hr PRN nausea or phenergan 12.5 mg q8hr PRN nausea  -vistaril 50mg q8hr PRN anxiety  11/3  h/O IVDA. U tox screen. addiction psychiatry consulted . .addiction psychiatry consulted. No need for inpatient psychiatric hospitalization. Continue symptomatic management of opioid withdrawal . needs  NALOXONE PRESCRIPTION FOR EMERGENCY USE OF OPIOID REVERSAL.       VTE Risk Mitigation (From admission, onward)           Ordered     enoxaparin injection 40 mg  Daily         11/03/22 0528     IP VTE HIGH RISK PATIENT  Once         11/03/22 0528     Place sequential compression device  Until discontinued         11/03/22 0528                    Discharge Planning   MOMO:      Code Status: Full Code   Is the patient medically ready for discharge?: No    Reason for patient still in hospital (select all that apply): Treatment  Discharge Plan A: Home                  Brandon Jacob MD  Department of Hospital Medicine   [facility] - Intensive Care ([place]-16)

## 2022-11-05 NOTE — ASSESSMENT & PLAN NOTE
11/5 . discussed with addiction psychiatry -For the indication of addiction Methadone can only be dispensed by a federally licensed methadone maintenance clinic. addiction pscyhiatry f/u  outpatient for MAT. discharge home today

## 2022-11-05 NOTE — NURSING
Pt AOX4. VSS. Throughout shift pt refused multiple medications due to him wanting methadone. Pt states that anytime he has wanted methadone he would get it without any problems. Pt also states that he not doing anything and doesn't want anyone to touch him. Notified OC and MD. After speaking with MD Lit via secure chat, pt would not be prescribed medication. Pt had vanc trough to be drawn, but here wasn't anyone in venipuncture to draw it. Notified Song Multani and OC of situation. Charted against vanc in the MAR. Per PharmD vanc would have to be retimed and day shift nurse would have to call before hanging.

## 2022-11-05 NOTE — PROGRESS NOTES
Patient refused AM lab draw, and all morning medications and he would not let me check his blood sugar.

## 2022-11-05 NOTE — NURSING
Patient and girlfriend gathered belongings for discharge. Patient very anxious to leave. He can  prescriptions at [facility] on the way out.

## 2022-11-05 NOTE — DISCHARGE SUMMARY
"[facility] - [facility] ([place] [address])  [facility] Medicine  Discharge Summary      Patient Name: Tye Florian III  MRN: [id number]  MARY: [id number]  Patient Class: OP- Observation  Admission Date: 11/2/2022  Hospital Length of Stay: 0 days  Discharge Date and Time:  11/05/2022 7:45 AM  Attending Physician: Brandon Jacob MD   Discharging Provider: Brandon Jacob MD  Primary Care Provider: Primary Doctor [facility] Medicine Team: [facility] Brandon Jacob MD  Primary Care Team: [facility]    HPI:   Tye Florian III is a 41 yo male IV drug user who presents with right ankle pain after injecting IV drugs into right ankle region. The majority of the history is collected from the ED and ortho notes as the patient refused to participate in an interview with me. Per ortho note "Patients states he was trying to inject heroin into his right ankle and thought he put it in a vein. For the past 1 day he been having pain in that ankle that has been getting worse with some associated redness and swelling and pain with weight bearing. When asked to point to the pain he points to his posterolateral ankle. Denies prior injuries or surgeries or infections to the ankle. Denies other MSK pains or paresthesias. Denies fevers or chills." When asked about most recent heroin use and frequency patient states "never" and sleeps through the rest of the exam despite being arousable to verbal stimuli. Denies pain currently.     In the ED, AFVSS. WBC 7.6. Sed rate wnl 16. CRP elevated 14.  Bicarb 20, Cr 0.7. Hepatitis V ab reactive. Ortho consulted and attempted right ankle joint aspiration no joint fluid obtained. Imaging R ankle/foot without fracture. MRI R ankle without evidence of soft tissue abscess, osteomyelitis, or septic arthritis. Subcutaneous soft tissue edema about the ankle, most pronounced laterally. S/p clindamycin 600 mg IV, benadryl 25 mg IV, morphine 4 mg IV. Admitted to  for IV " abx.      * No surgery found *      Hospital Course:   11/3 s/p Orthopedic surgery eval -CRP mildly elevated 14, ESR  WNL.  Attempted aspiration of right ankle to r/o septic arthritis at region of no cellulitis using fluroscopy. no joint fluid obtained to send for WBC or culture.  MRI Right  ankle WWO contrast -  No evidence of soft tissue abscess, osteomyelitis, or septic arthritis. Subcutaneous soft tissue edema about the ankle, most pronounced laterally. BC x2 ordered with h/O IVDA. U tox screen.  HCV ab + . LFTs WNL  .addiction psychiatry consulted. No need for inpatient psychiatric hospitalization. Continue symptomatic management of opioid withdrawal . needs  NALOXONE PRESCRIPTION FOR EMERGENCY USE OF OPIOID REVERSAL.  echo -The left ventricle is normal in size with normal systolic function.The estimated ejection fraction is 65%.Normal left ventricular diastolic function.Normal right ventricular size with normal right ventricular systolic function.  The estimated PA systolic pressure is 23 mmHg.Normal central venous pressure (3 mmHg).  11/4 BCx 2 pending. continue ceftriaxone and vancomycin. monitor for vancomycin toxicity . reports improved ankle pain   11/5 BCX 2 11/4 NGTD. vanc trough not drawn ovenright and vancomycin not given . started on oral doxycycline for 7 days . discussed with addiction psychiatry -For the indication of addiction Methadone can only be dispensed by a federally licensed methadone maintenance clinic. addiction pscyhiatry f/u  outpatient for MAT. discharge home today          Goals of Care Treatment Preferences:  Code Status: Full Code      Consults:   Consults (From admission, onward)        Status Ordering Provider     Inpatient consult to Psychiatry  Once        Provider:  (Not yet assigned)    Completed RADHA CHO     Pharmacy to dose Vancomycin consult  Once        Provider:  (Not yet assigned)   See Hyperspace for full Linked Orders Report.    Acknowledged RADHA CHO  LEOLA.     Inpatient consult to Orthopedic Surgery  Once        Provider:  (Not yet assigned)    Completed COCO GALVEZ        Assessment/Plan:      * Cellulitis of right ankle  42M with no known medical history but with recent IV heroin use with right foot/ankle pain and erythema after attempting to inject heroin to the area. Not participating in exam but does report no pain currently.     - AFVSS. 0/4 SIRS  - WBC 7.6. Sed rate wnl 16. CRP elevated 14. Hepatitis C ab reactive  - S/p clindamycin 600 mg IV, benadryl 25 mg IV, morphine 4 mg IV in the ED  - Xray imaging R ankle/foot without fracture.  - Ortho consulted in the ED and performed right ankle joint aspiration no joint fluid obtained.   - MRI R ankle without evidence of soft tissue abscess, osteomyelitis, or septic arthritis. consistent with cellulitis.   - Will continue IV clindamycin   - Pain control prn  - Monitor labs and vitals  11/3 s/p Orthopedic surgery eval -CRP mildly elevated 14, ESR  WNL.  Attempted aspiration of right ankle to r/o septic arthritis at region of no cellulitis using fluroscopy. no joint fluid obtained to send for WBC or culture.  MRI Right  ankle WWO contrast -  No evidence of soft tissue abscess, osteomyelitis, or septic arthritis. Subcutaneous soft tissue edema about the ankle, most pronounced laterally. BC x2 ordered  on Ibuprofen 400mg q 6h PRN for pain   11/4 BCx 2 pending. continue ceftriaxone and vancomycin. monitor for vancomycin toxicity   11/5 BCX 2 11/4 NGTD. vanc trough not drawn ovenright and vancomycin not given . started on oral doxycycline for 7 days .      Alcohol abuse   alcohol regularly, up to 2x/week, usually 3-4 beers at a time. continue thiamine, folate and MVI        Polysubstance abuse  opioid and cocaine use disorder.  He reports daily IV heroin .         IVDU (intravenous drug user)     11/5 . discussed with addiction psychiatry -For the indication of addiction Methadone can only be dispensed by a  federally licensed methadone maintenance clinic. addiction pscyhiatry f/u  outpatient for MAT. discharge home today      Tobacco use disorder  - On chart review, patient endorses tobacco use  - Unable to discuss cessation with patient as he is unwilling to participate in interview  - Nicotine patch ordered if patient desires        Hepatitis C antibody positive in blood  - Hepatitis C ab positive on ED labs  - Unable to discuss with patient as he will not participate   - Hepatitis C RNA ordered  11/3  HCV ab + . LFTs WNL .outpatient hepatology follow up     Heroin use  Patient unwilling to participate in initital interview. Extent of and last use of heroin unknown. Unable to calculate COWS.     - IVFs prn for volume support  - patient is able to protect airway  - VSS  - Narcan PRN   - aspiration precautions   - telemetry   - neuro checks, vital signs Q4H     For opiate withdrawall:  -dicyclomine 20 mg q6hr PRN abdominal muscle cramps  -loperamide 2 mg q3hr PRN diarrhea  -methocarbamol 500 mg q6hr PRN muscle spasm  -ibuprofen 400 mg q6hr PRN pain  -zofran 4mg q8hr PRN nausea or phenergan 12.5 mg q8hr PRN nausea  -vistaril 50mg q8hr PRN anxiety  11/3  h/O IVDA. U tox screen. addiction psychiatry consulted . .addiction psychiatry consulted. No need for inpatient psychiatric hospitalization. Continue symptomatic management of opioid withdrawal . needs  NALOXONE PRESCRIPTION FOR EMERGENCY USE OF OPIOID REVERSAL.        Final Active Diagnoses:    Diagnosis Date Noted POA    PRINCIPAL PROBLEM:  Cellulitis of right ankle [L03.115] 11/03/2022 Yes    Heroin use [F11.90] 11/03/2022 Yes    Hepatitis C antibody positive in blood [R76.8] 11/03/2022 Yes    Tobacco use disorder [F17.200] 11/03/2022 Yes    Opioid use disorder, severe, dependence [F11.20] 11/03/2022 Yes     Chronic    Cocaine use disorder, moderate, dependence [F14.20] 11/03/2022 Yes     Chronic    IVDU (intravenous drug user) [F19.90] 11/03/2022 Yes      Chronic    Polysubstance abuse [F19.10] 11/03/2022 Yes    Alcohol abuse [F10.10] 11/03/2022 Yes      Problems Resolved During this Admission:       Medications:  Reconciled Home Medications:      Medication List      Start taking these medications    doxycycline 100 MG capsule  Commonly known as: MONODOX  Take 1 capsule (100 mg total) by mouth every 12 (twelve) hours. for 7 days     folic acid 1 MG tablet  Commonly known as: FOLVITE  Take 1 tablet (1 mg total) by mouth once daily.     loperamide 2 mg capsule  Commonly known as: IMODIUM  Take 1 capsule (2 mg total) by mouth 4 (four) times daily as needed for Diarrhea.     methocarbamoL 500 MG Tab  Commonly known as: ROBAXIN  Take 1 tablet (500 mg total) by mouth every 6 (six) hours as needed.     multivitamin Tab  Take 1 tablet by mouth once daily.     nicotine 14 mg/24 hr  Commonly known as: NICODERM CQ  Place 1 patch onto the skin once daily.     ondansetron 4 MG tablet  Commonly known as: ZOFRAN  Take 1 tablet (4 mg total) by mouth every 6 (six) hours as needed for Nausea.     thiamine 100 MG tablet  Take 1 tablet (100 mg total) by mouth once daily.        Continue taking these medications    acetaminophen 500 MG tablet  Commonly known as: TYLENOL  Take 2 tablets (1,000 mg total) by mouth 3 (three) times daily as needed for Pain.        Stop taking these medications    ibuprofen 200 MG tablet  Commonly known as: ADVIL,MOTRIN     penicillin v potassium 500 MG tablet  Commonly known as: VEETID              Discharged Condition: fair    Disposition: Home or Self Care               Follow Up:     Patient Instructions:   No discharge procedures on file.    Laboratory/Diagnostic Data:    CBC/Anemia Labs: Coags:    Recent Labs   Lab 11/03/22  0112 11/04/22  0512   WBC 7.66 6.37   HGB 13.9* 14.8   HCT 40.8 44.0    197   MCV 90 92   RDW 13.3 13.2    No results for input(s): PT, INR, APTT in the last 168 hours.     Chemistries: ABG:   Recent Labs   Lab  11/03/22  0112 11/04/22  0512    140   K 3.6 3.5    107   CO2 20* 23   BUN 7 9   CREATININE 0.7 0.8   CALCIUM 8.6* 9.2   PROT 7.4  --    BILITOT 0.4  --    ALKPHOS 129  --    ALT 23  --    AST 28  --     No results for input(s): PH, PCO2, PO2, HCO3, POCSATURATED, BE in the last 168 hours.     POCT Glucose: HbA1c:    Recent Labs   Lab 11/03/22  0550 11/03/22  1524 11/04/22  0752 11/04/22  1544 11/04/22 2000   POCTGLUCOSE 89 113* 82 160* 144*    No results found for: HGBA1C     Cardiac Enzymes: Ejection Fractions:    No results for input(s): CPK, CPKMB, MB, TROPONINI in the last 72 hours. EF   Date Value Ref Range Status   11/03/2022 65 % Final          No results for input(s): COLORU, APPEARANCEUA, PHUR, SPECGRAV, PROTEINUA, GLUCUA, KETONESU, BILIRUBINUA, OCCULTUA, NITRITE, UROBILINOGEN, LEUKOCYTESUR, RBCUA, WBCUA, BACTERIA, SQUAMEPITHEL, HYALINECASTS in the last 48 hours.    Invalid input(s): WRIGHTSUR    No results found for: PROCAL, LACTATE  No results found for: BNP  CRP (mg/L)   Date Value   11/03/2022 14.0 (H)     Sed Rate (mm/Hr)   Date Value   11/03/2022 16     No results found for: DDIMER  No results found for: FERRITIN  No results found for: LDH  No results found for: TROPONINI, CPK  No results found for this or any previous visit.  No results found for: [id number]    Microbiology labs for the last week  Microbiology Results (last 7 days)     Procedure Component Value Units Date/Time    Blood culture [[id number]] Collected: 11/03/22 0857    Order Status: Completed Specimen: Blood from Peripheral, Left Hand Updated: 11/04/22 1022     Blood Culture, Routine No Growth to date      No Growth to date    Blood culture [[id number]] Collected: 11/03/22 0857    Order Status: Completed Specimen: Blood Updated: 11/04/22 1022     Blood Culture, Routine No Growth to date      No Growth to date    Culture, Anaerobe [[id number]] Collected: 11/03/22 0149    Order Status: Canceled Specimen: Joint Fluid from  Ankle, Right     Aerobic culture [[id number]] Collected: 11/03/22 0149    Order Status: Canceled Specimen: Bone from Ankle, Right     AFB Culture & Smear [[id number]] Collected: 11/03/22 0149    Order Status: Canceled Specimen: Joint Fluid from Ankle, Right     Fungus culture [[id number]] Collected: 11/03/22 0149    Order Status: Canceled Specimen: Joint Fluid from Ankle, Right     Gram stain [[id number]] Collected: 11/03/22 0149    Order Status: Canceled Specimen: Joint Fluid from Ankle, Right             Reviewed and noted in plan where applicable- Please see chart for full lab data.    Lines/Drains:       Peripheral IV - Single Lumen 11/03/22 0112 22 G Anterior;Left Wrist (Active)   Site Assessment Clean;Dry;No redness;Intact;No swelling;No warmth;No drainage 11/04/22 1600   Extremity Assessment Distal to IV No abnormal discoloration;No redness;No swelling;No warmth 11/04/22 1600   Line Status Infusing 11/04/22 1600   Dressing Status Clean;Dry;Intact 11/04/22 1600   Dressing Intervention Integrity maintained 11/04/22 1600   Dressing Change Due 11/03/22 11/03/22 1330   Site Change Due 11/06/22 11/03/22 1330   Reason Not Rotated Not due 11/04/22 1600   Number of days: 2       Imaging      Results for orders placed during the hospital encounter of 11/02/22    Echo    Interpretation Summary  · The left ventricle is normal in size with normal systolic function.  · The estimated ejection fraction is 65%.  · Normal left ventricular diastolic function.  · Normal right ventricular size with normal right ventricular systolic function.  · The estimated PA systolic pressure is 23 mmHg.  · Normal central venous pressure (3 mmHg).      Echo  · The left ventricle is normal in size with normal systolic function.  · The estimated ejection fraction is 65%.  · Normal left ventricular diastolic function.  · Normal right ventricular size with normal right ventricular systolic   function.  · The estimated PA systolic pressure is 23  mmHg.  · Normal central venous pressure (3 mmHg).     MRI Ankle W WO Contrast Right  Narrative: EXAMINATION:  MRI ANKLE W WO CONTRAST RIGHT    CLINICAL HISTORY:  Soft tissue infection suspected, ankle, xray done;eval for retrocalcaneal abscess;    TECHNIQUE:  MRI right ankle performed per routine protocol without contrast.    COMPARISON:  Right foot and ankle radiograph 11/02/2022    FINDINGS:  Emergent MRI right ankle with without contrast for evaluation of infection.    Bones:  No fracture or infiltrative process.    Joints: No joint effusions. Tibiotalar and subtalar joints are maintained.    Soft tissues: No enhancing fluid collections in the soft tissues.  Subcutaneous soft tissue edema about the ankle, most pronounced laterally.  Impression: No evidence of soft tissue abscess, osteomyelitis, or septic arthritis.    Subcutaneous soft tissue edema about the ankle, most pronounced laterally.  Correlate clinically for cellulitis in the clinical setting of suspected infection.    Electronically signed by resident: Lance Garcia  Date:    11/03/2022  Time:    04:15    Electronically signed by: Abad Garza MD  Date:    11/03/2022  Time:    04:44  X-Ray Foot Complete Right  Narrative: EXAMINATION:  XR ANKLE COMPLETE 3 VIEW RIGHT; XR FOOT COMPLETE 3 VIEW RIGHT    CLINICAL HISTORY:  Pain in right ankle and joints of right foot; Pain in right foot    TECHNIQUE:  AP, lateral, and oblique images of the right ankle were performed.    AP, lateral, and oblique images of the right foot were performed.    COMPARISON:  None    FINDINGS:  Right ankle: No acute fracture or dislocation. Alignment is normal. The ankle mortise is congruent. The talar dome is intact. Joint spaces are preserved. No effusion.    Right foot: No acute fracture or dislocation. Alignment is normal. The Lisfranc articulation is congruent. Joint spaces are preserved.  Impression: No acute osseous abnormality of the right ankle or foot.    Electronically  signed by: Harshad Valdez  Date:    11/03/2022  Time:    00:16  X-Ray Ankle Complete Right  Narrative: EXAMINATION:  XR ANKLE COMPLETE 3 VIEW RIGHT; XR FOOT COMPLETE 3 VIEW RIGHT    CLINICAL HISTORY:  Pain in right ankle and joints of right foot; Pain in right foot    TECHNIQUE:  AP, lateral, and oblique images of the right ankle were performed.    AP, lateral, and oblique images of the right foot were performed.    COMPARISON:  None    FINDINGS:  Right ankle: No acute fracture or dislocation. Alignment is normal. The ankle mortise is congruent. The talar dome is intact. Joint spaces are preserved. No effusion.    Right foot: No acute fracture or dislocation. Alignment is normal. The Lisfranc articulation is congruent. Joint spaces are preserved.  Impression: No acute osseous abnormality of the right ankle or foot.    Electronically signed by: Harshad Valdez  Date:    11/03/2022  Time:    00:16      Imaging:  Imaging Results          MRI Ankle W WO Contrast Right (Final result)  Result time 11/03/22 04:44:31    Final result by Abad Garza MD (11/03/22 04:44:31)             Impression:      No evidence of soft tissue abscess, osteomyelitis, or septic arthritis.    Subcutaneous soft tissue edema about the ankle, most pronounced laterally.  Correlate clinically for cellulitis in the clinical setting of suspected infection.    Electronically signed by resident: Lance Garcia  Date:    11/03/2022  Time:    04:15    Electronically signed by: Abad Garza MD  Date:    11/03/2022  Time:    04:44           Narrative:    EXAMINATION:  MRI ANKLE W WO CONTRAST RIGHT    CLINICAL HISTORY:  Soft tissue infection suspected, ankle, xray done;eval for retrocalcaneal abscess;    TECHNIQUE:  MRI right ankle performed per routine protocol without contrast.    COMPARISON:  Right foot and ankle radiograph 11/02/2022    FINDINGS:  Emergent MRI right ankle with without contrast for evaluation of infection.    Bones:  No fracture or  infiltrative process.    Joints: No joint effusions. Tibiotalar and subtalar joints are maintained.    Soft tissues: No enhancing fluid collections in the soft tissues.  Subcutaneous soft tissue edema about the ankle, most pronounced laterally.                             X-Ray Foot Complete Right (Final result)  Result time 11/03/22 00:16:32    Final result by Harshad Valdez DO (11/03/22 00:16:32)             Impression:      No acute osseous abnormality of the right ankle or foot.      Electronically signed by: Harshad Valdez  Date:    11/03/2022  Time:    00:16           Narrative:    EXAMINATION:  XR ANKLE COMPLETE 3 VIEW RIGHT; XR FOOT COMPLETE 3 VIEW RIGHT    CLINICAL HISTORY:  Pain in right ankle and joints of right foot; Pain in right foot    TECHNIQUE:  AP, lateral, and oblique images of the right ankle were performed.    AP, lateral, and oblique images of the right foot were performed.    COMPARISON:  None    FINDINGS:  Right ankle: No acute fracture or dislocation. Alignment is normal. The ankle mortise is congruent. The talar dome is intact. Joint spaces are preserved. No effusion.    Right foot: No acute fracture or dislocation. Alignment is normal. The Lisfranc articulation is congruent. Joint spaces are preserved.                             X-Ray Ankle Complete Right (Final result)  Result time 11/03/22 00:16:32    Final result by Harshad Valdez DO (11/03/22 00:16:32)             Impression:      No acute osseous abnormality of the right ankle or foot.      Electronically signed by: Harshad Valdez  Date:    11/03/2022  Time:    00:16           Narrative:    EXAMINATION:  XR ANKLE COMPLETE 3 VIEW RIGHT; XR FOOT COMPLETE 3 VIEW RIGHT    CLINICAL HISTORY:  Pain in right ankle and joints of right foot; Pain in right foot    TECHNIQUE:  AP, lateral, and oblique images of the right ankle were performed.    AP, lateral, and oblique images of the right foot were  performed.    COMPARISON:  None    FINDINGS:  Right ankle: No acute fracture or dislocation. Alignment is normal. The ankle mortise is congruent. The talar dome is intact. Joint spaces are preserved. No effusion.    Right foot: No acute fracture or dislocation. Alignment is normal. The Lisfranc articulation is congruent. Joint spaces are preserved.                                Follow Up Instructions:     No future appointments.    Discharge Instructions:  Discharge Procedure Orders   Ambulatory referral/consult to Hepatology   Standing Status: Future   Referral Priority: Routine Referral Type: Consultation   Referral Reason: Specialty Services Required   Requested Specialty: Hepatology   Number of Visits Requested: 1       Brandon Jacob MD  Attending Staff Physician  [facility] Medicine

## 2022-11-07 NOTE — HPI
"Tye Florian III is a 43 yo male IV drug user who presents with right ankle pain after injecting IV drugs into right ankle region. The majority of the history is collected from the ED and ortho notes as the patient refused to participate in an interview with me. Per ortho note "Patients states he was trying to inject heroin into his right ankle and thought he put it in a vein. For the past 1 day he been having pain in that ankle that has been getting worse with some associated redness and swelling and pain with weight bearing. When asked to point to the pain he points to his posterolateral ankle. Denies prior injuries or surgeries or infections to the ankle. Denies other MSK pains or paresthesias. Denies fevers or chills." When asked about most recent heroin use and frequency patient states "never" and sleeps through the rest of the exam despite being arousable to verbal stimuli. Denies pain currently.     In the ED, AFVSS. WBC 7.6. Sed rate wnl 16. CRP elevated 14.  Bicarb 20, Cr 0.7. Hepatitis V ab reactive. Ortho consulted and attempted right ankle joint aspiration no joint fluid obtained. Imaging R ankle/foot without fracture. MRI R ankle without evidence of soft tissue abscess, osteomyelitis, or septic arthritis. Subcutaneous soft tissue edema about the ankle, most pronounced laterally. S/p clindamycin 600 mg IV, benadryl 25 mg IV, morphine 4 mg IV. Admitted to  for IV abx.  " Called to change Pt date and time and is requesting to have the her meds refilled she stated that she has sent  In a request already- Can you please check on this- Thanks

## 2022-11-08 LAB
BACTERIA BLD CULT: NORMAL
BACTERIA BLD CULT: NORMAL

## 2022-11-16 ENCOUNTER — TELEPHONE (OUTPATIENT)
Dept: HEPATOLOGY | Facility: CLINIC | Age: 42
End: 2022-11-16
Payer: MEDICAID

## 2022-11-16 NOTE — TELEPHONE ENCOUNTER
Dr. Brandon Jacob ordered that patient be scheduled for a hepatology consult visit.  Patient hep c quant positive.  Attempt made to reach him for rescheduling.  Unable to LVM so letter sent asking that he call for scheduling.

## 2023-01-17 ENCOUNTER — HOSPITAL ENCOUNTER (EMERGENCY)
Facility: HOSPITAL | Age: 43
Discharge: LEFT BEFORE DISCHARGE | End: 2023-01-17
Attending: EMERGENCY MEDICINE
Payer: MEDICAID

## 2023-01-17 VITALS
SYSTOLIC BLOOD PRESSURE: 124 MMHG | HEIGHT: 71 IN | BODY MASS INDEX: 25.2 KG/M2 | HEART RATE: 100 BPM | DIASTOLIC BLOOD PRESSURE: 67 MMHG | RESPIRATION RATE: 18 BRPM | OXYGEN SATURATION: 100 % | TEMPERATURE: 98 F | WEIGHT: 180 LBS

## 2023-01-17 DIAGNOSIS — Z48.01 ENCOUNTER FOR REPLACEMENT OF DRESSING OF SURGICAL WOUND: Primary | ICD-10-CM

## 2023-01-17 PROCEDURE — 99282 EMERGENCY DEPT VISIT SF MDM: CPT | Mod: ,,, | Performed by: PHYSICIAN ASSISTANT

## 2023-01-17 PROCEDURE — 99282 EMERGENCY DEPT VISIT SF MDM: CPT

## 2023-01-17 PROCEDURE — 99282 PR EMERGENCY DEPT VISIT,LEVEL II: ICD-10-PCS | Mod: ,,, | Performed by: PHYSICIAN ASSISTANT

## 2023-01-18 ENCOUNTER — NURSE TRIAGE (OUTPATIENT)
Dept: ADMINISTRATIVE | Facility: CLINIC | Age: 43
End: 2023-01-18
Payer: MEDICAID

## 2023-01-18 ENCOUNTER — PATIENT OUTREACH (OUTPATIENT)
Dept: EMERGENCY MEDICINE | Facility: HOSPITAL | Age: 43
End: 2023-01-18
Payer: MEDICAID

## 2023-01-18 NOTE — TELEPHONE ENCOUNTER
OOC NT incoming call -  Pt reports rectal bleeding. States he woke up from a nap and had some noted bleeding to undergarments. He then attempted to have BM and had  large blood clots and large amt of bleeding to toilet water. Rectal bleed protocol followed and pt advised  to go to ED now. Instructed to allow friend, Dai, who is with him now to drive him to ED. Instructed if he feels to weak to walk to car to call 911. Pt verbalized agreement and denies any questions at this time. No PCP on file to route encounter to.   Reason for Disposition   SEVERE rectal bleeding (large blood clots; constant or on and off bleeding)    Additional Information   Negative: Passed out (i.e., fainted, collapsed and was not responding)   Negative: Shock suspected (e.g., cold/pale/clammy skin, too weak to stand, low BP, rapid pulse)   Negative: Vomiting red blood or black (coffee ground) material   Negative: Sounds like a life-threatening emergency to the triager    Protocols used: Rectal Bleeding-A-[place]

## 2023-01-18 NOTE — ED NOTES
Pt arrives back in waiting room. States he never left was just changing clothes. Charge notified.
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
## 2023-01-23 ENCOUNTER — TELEPHONE (OUTPATIENT)
Dept: HEPATOLOGY | Facility: CLINIC | Age: 43
End: 2023-01-23
Payer: MEDICAID

## 2023-01-23 NOTE — TELEPHONE ENCOUNTER
Patient was a no-show for scheduled appt with PA Scheuermann on 1/19/23.  Attempt made to reach him for rescheduling but unable to LVM.  Letter sent asking that he call hepatology back.

## 2023-02-12 ENCOUNTER — HOSPITAL ENCOUNTER (EMERGENCY)
Facility: HOSPITAL | Age: 43
Discharge: HOME | End: 2023-02-12
Attending: EMERGENCY MEDICINE
Payer: MEDICAID

## 2023-02-12 VITALS
TEMPERATURE: 100 F | SYSTOLIC BLOOD PRESSURE: 109 MMHG | DIASTOLIC BLOOD PRESSURE: 67 MMHG | HEIGHT: 71 IN | OXYGEN SATURATION: 97 % | RESPIRATION RATE: 18 BRPM | BODY MASS INDEX: 22.4 KG/M2 | HEART RATE: 103 BPM | WEIGHT: 160 LBS

## 2023-02-12 DIAGNOSIS — Z87.19 HISTORY OF CONSTIPATION: ICD-10-CM

## 2023-02-12 DIAGNOSIS — Z01.30 BLOOD PRESSURE CHECK: Primary | ICD-10-CM

## 2023-02-12 PROCEDURE — 99283 EMERGENCY DEPT VISIT LOW MDM: CPT | Mod: ,,, | Performed by: EMERGENCY MEDICINE

## 2023-02-12 PROCEDURE — 99283 PR EMERGENCY DEPT VISIT,LEVEL III: ICD-10-PCS | Mod: ,,, | Performed by: EMERGENCY MEDICINE

## 2023-02-12 PROCEDURE — 99282 EMERGENCY DEPT VISIT SF MDM: CPT

## 2023-02-13 NOTE — ED TRIAGE NOTES
Chief Complaint   Patient presents with    Constipation     Last bm this am, after taking fiber pills, stool softener, suppository,     Abdominal Pain     APPEARANCE: No acute distress.    NEURO: Awake, alert, appropriate for age  HEENT: Head symmetrical. No obvious deformity  RESPIRATORY: Airway is open and patent. Respirations are spontaneous on room air.   NEUROVASCULAR: All extremities are warm and pink with capillary refill less than 3 seconds.   MUSCULOSKELETAL: Moves all extremities, wiggling toes and moving hands.   SKIN: Warm and dry, adequate turgor, mucus membranes moist and pink    Will continue to monitor.

## 2023-02-13 NOTE — DISCHARGE INSTRUCTIONS
Start high fiber diet, for example high vegetable and wheat products. Consider OTC metamucil supplement daily.  If you are constipated again, Start stool softeners (docusate sodium) to help soften stool. Start daily laxative such as polyethylene glycol once a day until you achieve 1 bowel movement daily, assuming you are eating 3 meals daily.   Stay hydrated by drinking 2-3 liters of water daily. Stay active; this will help keep your bowels movement.  Follow up with PCP.  Please consider rehab.   Return to the ER for new or worsening symptoms.

## 2023-02-13 NOTE — ED NOTES
Pt arrives to Ed with c/o abdominal discomfort and constipation. Reports taking OTC medications with no relief of symptoms. Pt sitting in chair, respirations even, unlabored, NAD noted, answering questions appropriately

## 2023-02-13 NOTE — ED PROVIDER NOTES
"Encounter Date: 2/12/2023       History     Chief Complaint   Patient presents with    Constipation     Last bm this am, after taking fiber pills, stool softener, suppository,     Abdominal Pain     7:51 PM  Patient is a 42-year-old male with a history of polysubstance abuse, IV drug use, recent admission for GI bleed from ischemic colitis who presents to [facility] ED for blood pressure check and dietary advice.    Patient states that he was recently admitted and discharged.  He is feeling well.  He walk 2 blocks here to the ED without shortness of breath, weakness, or fatigue.  He was admitted for ischemic colitis suspected due to hypotension from polysubstance abuse.  He has not had any bright red blood per rectum.  He denies any melena.   He would like to check his blood pressure because he has a history of hypotension.    He states that for the past 4 days he has been constipated with abdominal pain.  He tried stool softeners, enemas, fiber supplements and finally this morning he was able to pass stool.  He denies any active abdominal pain at this time.  He has had several episodes of brown without blood stools and reports feeling "hungry".  He is interested in learning what type of diet he can eat moving forward.    Review of patient's allergies indicates:  No Known Allergies  History reviewed. No pertinent past medical history.  History reviewed. No pertinent surgical history.  History reviewed. No pertinent family history.  Social History     Tobacco Use    Smoking status: Every Day     Types: Cigarettes    Smokeless tobacco: Never   Substance Use Topics    Alcohol use: Yes    Drug use: Yes     Types: IV     Review of Systems   Constitutional:  Negative for fatigue.   Respiratory:  Negative for shortness of breath.    Gastrointestinal:  Positive for constipation (resolved after meds; LBM this morning). Negative for abdominal pain, blood in stool and diarrhea.   Neurological:  Negative for dizziness, weakness and " light-headedness.     Physical Exam     Initial Vitals [02/12/23 1841]   BP Pulse Resp Temp SpO2   116/71 (!) 122 18 99.6 °F (37.6 °C) 99 %      MAP       --         Physical Exam    Vitals reviewed.  Constitutional: He appears well-developed and well-nourished. He is not diaphoretic. He is cooperative.  Non-toxic appearance. He does not have a sickly appearance. He does not appear ill. No distress. Face mask in place.   HENT:   Head: Normocephalic and atraumatic.   Nose: Nose normal.   Mouth/Throat: No trismus in the jaw.   Eyes: Conjunctivae and EOM are normal.   Neck:   Normal range of motion.  Pulmonary/Chest: No accessory muscle usage. No tachypnea. No respiratory distress.   Abdominal: Abdomen is soft. Bowel sounds are normal. He exhibits no distension. There is no abdominal tenderness. There is no rebound, no guarding, no tenderness at McBurney's point and negative Figueredo's sign.   Musculoskeletal:         General: Normal range of motion.      Cervical back: Normal range of motion.     Neurological: He is alert. He has normal strength.   Skin: Skin is dry. No pallor.       ED Course   Procedures  Labs Reviewed - No data to display         Imaging Results    None          Medications - No data to display  Medical Decision Making:   History:   Old Medical Records: I decided to obtain old medical records.  Old Records Summarized: records from clinic visits and records from previous admission(s).  Initial Assessment:   Patient is a 42-year-old male with a history of polysubstance abuse, IV drug use, recent admission for GI bleed from ischemic colitis who presents to [facility] ED for blood pressure check and dietary advice.  Differential Diagnosis:   Includes but is not limited to resolved constipation, blood pressure check, drug use.  Patient denies any weakness, fatigue, shortness of breath.  His vitals are normal x2.  He denies systemic symptoms.  He does not look toxic.  Doubt sepsis.  His stools are without melena  or blood.  Doubt active GI bleed or anemia requirng transfusion.  Clinical Tests:   Lab Tests: Reviewed  ED Management:  Patient is mainly here for two reasons. He wanted to check his BP again because he has a history of hypotension. Repeat /67 with HR at 103. He admits to using IV fentanyl today. Appears slightly anxious. No systemic symptoms. He has not had BRBPR nor melena in a few weeks. Doubt anemia or shock.     He also had relief of his constipation this morning after attempting for the past 4 days. He states he feels hungry. He would like to know what type of foods he can eat for healthy digestion and to prevent constipation. I discussed proper diet. He can continue fiber supplements, stool softener, and start laxatives if he becomes constipated again. His abd is soft NTND. I doubt acute surgical abdomen or active colitis.     He has a scheduled appointment with GI.  Also has weekly follow-up with orthopedics for his history of brachial artery injury.  All of his questions were answered.  He is comfortable with this plan.  He was given strict ED return precautions.           ED Course as of 02/13/23 2127   [place] Feb 12, 2023 1944 BP: 116/71 [CL]   1944 Temp: 99.6 °F (37.6 °C) [CL]   1944 Pulse(!): 122 [CL]   1944 Resp: 18 [CL]   1944 SpO2: 99 % [CL]      ED Course User Index  [CL] Sarah Lobo PA-C                 Clinical Impression:   Final diagnoses:  [Z01.30] Blood pressure check (Primary)  [Z87.19] History of constipation        ED Disposition Condition    Discharge Stable          ED Prescriptions    None       Follow-up Information       Follow up With Specialties Details Why Contact [facility]  Schedule an appointment as soon as possible for a visit   [address] [place] [id number] [phone]    [facility] - Emergency Dept Emergency Medicine  If symptoms worsen [address] [facility] [address] [phone]               Sarah Lobo  AROLDO  02/13/23 2124

## 2023-02-15 NOTE — PROGRESS NOTES
Milady Cooper LPN  ED Navigator  Emergency Department    [place]: [facility] [place]  Role: Community Health Worker    Date: 02/15/2023  Patient Name: Tye Florian III  MRN: [id number]  PCP: Primary Doctor No    Assessment:     Tye Florian III is a 42 y.o. male who has presented to ED for constipation. Patient has visited the ED 2 times in the past 3 months. Patient did not contact PCP.     ED Navigator Initial Assessment    ED Navigator Enrollment Documentation  Consent to Services  Does patient consent to completing the assessment?: Yes  Contact  Method of Initial Contact: Phone  Transportation  Does the patient have issues with Transportation?: Yes  Does the patient have transportation to and from healthcare appointments?: Yes  Can family, friends, or others help?: No  Lack of transportation to appts, pharmacy, etc.?: No  What type of assistance is needed?: Standard (RTA/MITS)  What is available in their region?: bus, uber  Insurance Coverage  Do you have coverage/adequate coverage?: Yes  Type/kind of coverage: Medicaid/Healthy Blue ([facility])  Is patient able to afford co-pays/deductibles?: Yes  Is patient able to afford HME or supplies?: Yes  Does patient have an established [facility] PCP?: No  Does patient need assistance finding a PCP?: Yes  Does the patient have a lack of adequate coverage?: No  Specialist Appointment  Did the patient come to the ED to see a specialist?: No  Does the patient have a pending specialist referral?: No  Does the patient have a specialist appointment made?: No  PCP Follow Up Appointment  Has the patient had an appointment with a primary care provider in the past year?: No  Does the patient have a follow up appontment with a PCP?: No  When was the last time you saw your PCP?: 2/15/22  Why does the patient not have a follow up scheduled?: Unable to find PCP to take coverage, No established [facility]/outside PCP  Would you like an [facility] PCP?: Yes  Medications  Is  patient able to afford medication?: Yes  Is patient unable to get medication due to lack of transportation?: No  Psychological  Does the patient have psycho-social concerns?: No  Food  Does the patient have concerns about food?: Yes  What concerns does the patient have regarding food?: Lack of food  Communication/Education  Does the patient have limited English proficiency/English not primary language?: No  Does patient have low literacy and/or low health literacy?: No  Does patient have concerns with care?: No  Does patient have dissatisfaction with care?: No  Other Financial Concerns  Does the patient have immediate financial distress?: Yes  Does the patient have general financial concerns?: Yes  Other Social Barriers/Concerns  Does the patient have any additional barriers or concerns?: Housing concerns, Unable to afford utilities  Primary Barrier  Barriers identified: Financial barrier (health insurance, employment, etc.), Structural barrier (service availability, waiting times, etc.)  Root Cause of ED Utilization: Lack of Access to Primary Care  Plan to address Lack of Access to Primary Care: Provided [facility] PCP assistance line [phone]  Next steps: Provided Education  Was education/educational materials provided surrounding PCP services/creating a medical home?: Yes Was education verbal or written?: Written     Was education/educational materials provided surrounding low cost, healthy foods?: Yes Was education verbal or written?: Written     Was education/educational materials provided surrounding other items? If so, use comment to explain.: Yes Was education verbal or written?: Written   Plan: Provided information for [facility] On [facility] 24/7 Nurse triage line, [phone] or 1-[facility] ([phone])  Expected Date of Follow Up 1: 3/15/23         Social History     Socioeconomic History    Marital status: Single   Tobacco Use    Smoking status: Every Day     Types: Cigarettes    Smokeless tobacco:  Never   Substance and Sexual Activity    Alcohol use: Yes    Drug use: Yes     Types: IV     Social Determinants of Health     Financial Resource Strain: Medium Risk    Difficulty of Paying Living Expenses: Somewhat hard   Food Insecurity: Food Insecurity Present    Worried About Running Out of Food in the Last Year: Sometimes true    Ran Out of Food in the Last Year: Sometimes true   Transportation Needs: No Transportation Needs    Lack of Transportation (Medical): No    Lack of Transportation (Non-Medical): No   Physical Activity: Inactive    Days of Exercise per Week: 0 days    Minutes of Exercise per Session: 0 min   Stress: Stress Concern Present    Feeling of Stress : To some extent   Social Connections: Socially Isolated    Frequency of Communication with Friends and Family: Never    Frequency of Social Gatherings with Friends and Family: Never    Attends [employer] Services: Never    Active Member of Clubs or Organizations: No    Attends Club or Organization Meetings: Never    Marital Status: Never    Housing Stability: High Risk    Unable to Pay for Housing in the Last Year: Patient refused    Unstable Housing in the Last Year: Yes       Plan:   Call placed to Pt in regards to recent ER visit for constipation. Pt states he has not scheduled any f/u appt's at this time. Call placed to [facility] to schedule per ED Navigator. No appt's available at this time. PCP hotline provided. Patient was given education on (The Right Care at the Right Level information, [facility] information, and Heart healthy diet tips).  Patient was given Medicaid PCP Information Line ([phone]). Patient was given education on Rent/Mortgage payment assistance for their region. Patient was given utility assistance resources for their area.  Pt advised that the ED Navigator will reach out again to f/u. Pt advised that she can reach out for any questions or concerns.  Milady Cooper    Food Insecurity: Resources  provided  Transportation Insecurity: Resources provided    Appointment made with: Primary Doctor No

## 2023-02-17 ENCOUNTER — TELEPHONE (OUTPATIENT)
Dept: HEPATOLOGY | Facility: CLINIC | Age: 43
End: 2023-02-17
Payer: MEDICAID

## 2023-02-17 NOTE — TELEPHONE ENCOUNTER
----- Message from Leti Altman sent at 2/16/2023  4:55 PM CST -----  Regarding: FW: schedule appt  Zenaida Charlton,    Can you please reschedule this patient to be seen with Olesya.(Tye Hardin [facility] MRN# [id number])        Thanks,  Loni  ----- Message -----  From: Mina Burnett  Sent: 2/16/2023  11:36 AM CST  To: [facility] Hepatology Scheduling  Subject: schedule appt                                    Patient calling to schedule appt. Requesting a call back to schedule.    Call: [phone]

## 2023-02-17 NOTE — TELEPHONE ENCOUNTER
Patient was a no-show for scheduled appt with PA Scheuermann on 1/19/23.  Attempt made to reach him for rescheduling.  LVM and sent letter asking that he call hepatology back.

## 2023-03-15 ENCOUNTER — PATIENT OUTREACH (OUTPATIENT)
Dept: EMERGENCY MEDICINE | Facility: HOSPITAL | Age: 43
End: 2023-03-15
Payer: MEDICAID

## 2023-03-15 NOTE — PROGRESS NOTES
Call placed to Pt to f/u from initial enrollment. No answer. V/M left. Next f/u scheduled for 4/12/23.

## 2023-04-12 ENCOUNTER — PATIENT OUTREACH (OUTPATIENT)
Dept: EMERGENCY MEDICINE | Facility: HOSPITAL | Age: 43
End: 2023-04-12
Payer: MEDICAID

## 2023-04-12 NOTE — PROGRESS NOTES
Call placed to Pt to f/u from last encounter. No answer. Unable to leave a v/m. Next f/u scheduled for 5-17-23.

## 2023-05-17 ENCOUNTER — PATIENT OUTREACH (OUTPATIENT)
Dept: EMERGENCY MEDICINE | Facility: HOSPITAL | Age: 43
End: 2023-05-17
Payer: MEDICAID

## 2023-06-28 ENCOUNTER — PATIENT OUTREACH (OUTPATIENT)
Dept: EMERGENCY MEDICINE | Facility: HOSPITAL | Age: 43
End: 2023-06-28
Payer: MEDICAID

## 2023-06-28 NOTE — PROGRESS NOTES
Call placed for 3rd attempt at f/u. No answer. ED Navigator to close the encounter at this time.

## 2024-01-16 ENCOUNTER — HOSPITAL ENCOUNTER (EMERGENCY)
Facility: HOSPITAL | Age: 44
Discharge: HOME | End: 2024-01-17
Attending: EMERGENCY MEDICINE
Payer: MEDICAID

## 2024-01-16 ENCOUNTER — HOSPITAL ENCOUNTER (EMERGENCY)
Facility: HOSPITAL | Age: 44
Discharge: HOME | End: 2024-01-16
Attending: EMERGENCY MEDICINE
Payer: MEDICAID

## 2024-01-16 VITALS
HEIGHT: 71 IN | BODY MASS INDEX: 22.4 KG/M2 | DIASTOLIC BLOOD PRESSURE: 65 MMHG | RESPIRATION RATE: 18 BRPM | TEMPERATURE: 98 F | SYSTOLIC BLOOD PRESSURE: 120 MMHG | OXYGEN SATURATION: 96 % | HEART RATE: 88 BPM | WEIGHT: 160 LBS

## 2024-01-16 DIAGNOSIS — M79.601 RIGHT ARM PAIN: Primary | ICD-10-CM

## 2024-01-16 DIAGNOSIS — K08.89 DENTALGIA: Primary | ICD-10-CM

## 2024-01-16 PROCEDURE — 99283 EMERGENCY DEPT VISIT LOW MDM: CPT | Mod: 27

## 2024-01-16 PROCEDURE — 99284 EMERGENCY DEPT VISIT MOD MDM: CPT

## 2024-01-16 RX ORDER — PENICILLIN V POTASSIUM 500 MG/1
500 TABLET, FILM COATED ORAL EVERY 8 HOURS
Qty: 15 TABLET | Refills: 0 | Status: SHIPPED | OUTPATIENT
Start: 2024-01-16 | End: 2024-01-21

## 2024-01-16 RX ORDER — IBUPROFEN 600 MG/1
600 TABLET ORAL EVERY 6 HOURS PRN
Qty: 20 TABLET | Refills: 0 | Status: SHIPPED | OUTPATIENT
Start: 2024-01-16

## 2024-01-16 RX ORDER — GABAPENTIN 300 MG/1
600 CAPSULE ORAL
Status: COMPLETED | OUTPATIENT
Start: 2024-01-17 | End: 2024-01-16

## 2024-01-16 RX ORDER — GABAPENTIN 600 MG/1
600 TABLET ORAL 2 TIMES DAILY
Qty: 60 TABLET | Refills: 11 | Status: SHIPPED | OUTPATIENT
Start: 2024-01-16 | End: 2025-01-15

## 2024-01-16 RX ADMIN — GABAPENTIN 600 MG: 300 CAPSULE ORAL at 11:01

## 2024-01-16 NOTE — ED PROVIDER NOTES
Encounter Date: 1/16/2024       History     Chief Complaint   Patient presents with    Dental Pain     42 yo M with pmhx polysubstance abuse (cocaine, heroin, ETOH, IVDU), tobacco use presents with a chief complaint of dentalgia.  Patient notes pain at the right mandibular 3rd molar for the past 2 days.  Pain is intermittent but severe when present.  Mild currently.  No trauma.  His left mandibular 3rd molar was surgically removed 2 months ago.  He denies any fevers.  No trouble swallowing.        Review of patient's allergies indicates:  No Known Allergies  History reviewed. No pertinent past medical history.  History reviewed. No pertinent surgical history.  History reviewed. No pertinent family history.  Social History     Tobacco Use    Smoking status: Every Day     Types: Cigarettes    Smokeless tobacco: Never   Substance Use Topics    Alcohol use: Yes    Drug use: Yes     Types: IV     Review of Systems    Physical Exam     Initial Vitals [01/16/24 1242]   BP Pulse Resp Temp SpO2   120/65 88 18 98.1 °F (36.7 °C) 96 %      MAP       --         Physical Exam    Nursing note and vitals reviewed.  Constitutional: He appears well-developed and well-nourished. He is not diaphoretic. No distress.   HENT:   Head: Normocephalic.   No trismus  Right mandibular 3rd molar with surrounding gingival injection and tenderness to percussion, no fluctuance to suggest obvious abscess  Phonation normal   Cardiovascular:  Normal rate.           Pulmonary/Chest: No stridor. No respiratory distress.     Neurological: He is alert.   Skin: Skin is dry.         ED Course   Procedures  Labs Reviewed - No data to display       Imaging Results    None          Medications - No data to display  Medical Decision Making  42 yo M with pmhx polysubstance abuse (cocaine, heroin, ETOH, IVDU), tobacco use presents with a chief complaint of dentalgia.     Patient has impacted right wisdom tooth but unable to rule out small underlying periapical  abscess.  No evidence of Palmer's or malignant dental infection.  Will initiate course of treatment with penicillin.  He was offered a dental block but declined.  Will discharge on course of NSAIDs.  Dental resources also provided.  Return precautions.    Risk  Prescription drug management.                                      Clinical Impression:  Final diagnoses:  [K08.89] Dentalgia (Primary)          ED Disposition Condition    Discharge Stable          ED Prescriptions       Medication Sig Dispense Start Date End Date Auth. Provider    penicillin v potassium (VEETID) 500 MG tablet Take 1 tablet (500 mg total) by mouth every 8 (eight) hours. for 5 days 15 tablet 1/16/2024 1/21/2024 Eyal Navarro MD    ibuprofen (ADVIL,MOTRIN) 600 MG tablet Take 1 tablet (600 mg total) by mouth every 6 (six) hours as needed for Pain. 20 tablet 1/16/2024 -- Eyal Navarro MD          Follow-up Information       Follow up With Specialties Details Why Contact Info    [facility] - Emergency Dept Emergency Medicine  As needed, If symptoms worsen [address]  [facility] [address] [phone]             Eyal Navarro MD  01/16/24 2420

## 2024-01-16 NOTE — DISCHARGE INSTRUCTIONS
There are no dentists in the [facility] system.  You may call any of the dental clinics provided.    DENTAL RESOURCES      [facility]       [phone]     [facility] 8-4 Monday - Friday       [phone]     [facility]       [phone]     [facility]                                 0-6 years                                                                                             7-13 years     [phone]     [facility]    [facility]     [phone]     [facility]       [phone]     [facility]    [address], Mon-Fri not on Wed  8am-4pm    Over 60 years old living in [place]           [phone]     [facility]    [address] [place]     [phone]     [facility] [place]       [phone]     [facility]  [address]       [phone]     [facility]       [phone]

## 2024-01-17 VITALS
BODY MASS INDEX: 22.32 KG/M2 | RESPIRATION RATE: 18 BRPM | TEMPERATURE: 98 F | HEART RATE: 85 BPM | SYSTOLIC BLOOD PRESSURE: 110 MMHG | WEIGHT: 160 LBS | DIASTOLIC BLOOD PRESSURE: 55 MMHG | OXYGEN SATURATION: 98 %

## 2024-01-17 PROCEDURE — 25000003 PHARM REV CODE 250: Performed by: EMERGENCY MEDICINE

## 2024-01-17 NOTE — PLAN OF CARE
KENNETH set up patient a room with [facility]. Patient was approved for a room until Thursday.    KENNETH made treating doctor aware by chat that the patient will need to check out by Thursday. KENNETH also asked if the ER staff will provide the patient with sandwiches and snacks.     KENNETH was told that a cab will be there to  patient at the ER. KENNETH also made doctor aware. Patient must be ready right now and the front entrance officers should be made aware that this patient is awaiting a cab.     VIVIEN Sarah, MSW-LMSW  Medical Social Worker/  ER Department
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
## 2024-01-17 NOTE — DISCHARGE INSTRUCTIONS
Return to the emergency room if you have chest pain, fever, trouble breathing, rectal bleeding, fainting or other new concerning symptoms or worsening symptoms.
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
## 2024-01-31 ENCOUNTER — HOSPITAL ENCOUNTER (EMERGENCY)
Facility: HOSPITAL | Age: 44
Discharge: HOME | End: 2024-01-31
Attending: EMERGENCY MEDICINE
Payer: MEDICAID

## 2024-01-31 VITALS
BODY MASS INDEX: 22.32 KG/M2 | TEMPERATURE: 98 F | HEART RATE: 81 BPM | RESPIRATION RATE: 18 BRPM | WEIGHT: 160 LBS | DIASTOLIC BLOOD PRESSURE: 74 MMHG | SYSTOLIC BLOOD PRESSURE: 132 MMHG | OXYGEN SATURATION: 99 %

## 2024-01-31 DIAGNOSIS — M79.89 LEG SWELLING: ICD-10-CM

## 2024-01-31 DIAGNOSIS — L03.90 CELLULITIS, UNSPECIFIED CELLULITIS SITE: Primary | ICD-10-CM

## 2024-01-31 PROCEDURE — 99284 EMERGENCY DEPT VISIT MOD MDM: CPT | Mod: 25

## 2024-01-31 PROCEDURE — 25000003 PHARM REV CODE 250: Performed by: PHYSICIAN ASSISTANT

## 2024-01-31 RX ORDER — SULFAMETHOXAZOLE AND TRIMETHOPRIM 800; 160 MG/1; MG/1
1 TABLET ORAL
Status: COMPLETED | OUTPATIENT
Start: 2024-01-31 | End: 2024-01-31

## 2024-01-31 RX ORDER — SULFAMETHOXAZOLE AND TRIMETHOPRIM 800; 160 MG/1; MG/1
1 TABLET ORAL 2 TIMES DAILY
Qty: 20 TABLET | Refills: 0 | Status: SHIPPED | OUTPATIENT
Start: 2024-01-31 | End: 2024-02-10

## 2024-01-31 RX ADMIN — SULFAMETHOXAZOLE AND TRIMETHOPRIM 1 TABLET: 800; 160 TABLET ORAL at 03:01

## 2024-01-31 NOTE — ED TRIAGE NOTES
Chief Complaint   Patient presents with    Leg Swelling     Left leg. Surgery last year. Stopped taking blood thinner several months ago     APPEARANCE: No acute distress.    NEURO: Awake, alert, appropriate for age  HEENT: Head symmetrical. No obvious deformity  RESPIRATORY: Airway is open and patent. Respirations are spontaneous on room air.   NEUROVASCULAR: All extremities are warm and pink with capillary refill less than 3 seconds.   MUSCULOSKELETAL: Moves all extremities, wiggling toes and moving hands.   SKIN: Warm and dry, adequate turgor, mucus membranes moist and pink    Will continue to monitor.

## 2024-01-31 NOTE — ED PROVIDER NOTES
Encounter Date: 1/31/2024       History     Chief Complaint   Patient presents with    Leg Swelling     Left leg. Surgery last year. Stopped taking blood thinner several months ago     Year old male presents for evaluation of swelling of the left lower extremity.  No trauma or injury reported patient noticed swelling over the past 2-3 days worsening today.  Denies any fevers or chills, no chest pain, no shortness a breath.  Significant past medical history detailed in the chart.  Was previously on anticoagulation but stopped a few months ago.      Review of patient's allergies indicates:  No Known Allergies  History reviewed. No pertinent past medical history.  History reviewed. No pertinent surgical history.  History reviewed. No pertinent family history.  Social History     Tobacco Use    Smoking status: Every Day     Types: Cigarettes    Smokeless tobacco: Never   Substance Use Topics    Alcohol use: Yes    Drug use: Yes     Types: IV     Review of Systems   Constitutional:  Negative for fever.   HENT:  Negative for sore throat.    Respiratory:  Negative for shortness of breath.    Cardiovascular:  Positive for leg swelling. Negative for chest pain.   Gastrointestinal:  Negative for nausea.   Genitourinary:  Negative for dysuria.   Musculoskeletal:  Negative for back pain.   Skin:  Negative for rash.   Neurological:  Negative for weakness.   Hematological:  Does not bruise/bleed easily.       Physical Exam     Initial Vitals [01/31/24 0012]   BP Pulse Resp Temp SpO2   126/72 96 16 98.9 °F (37.2 °C) 98 %      MAP       --         Physical Exam    Constitutional: Vital signs are normal. He appears well-developed and well-nourished.   HENT:   Head: Normocephalic and atraumatic.   Right Ear: Hearing normal.   Left Ear: Hearing normal.   Eyes: Conjunctivae are normal.   Cardiovascular:  Normal rate and regular rhythm.           Pulmonary/Chest:   CTAB   Abdominal: Abdomen is soft. Bowel sounds are normal.    Musculoskeletal:         General: Normal range of motion.      Comments: Left lower extremity is swollen, nontender.  Significant swelling distal from the knee, primarily around the left ankle and the left foot.  The pulses in the foot, good capillary refill.  The foot is warm to touch     Neurological: He is alert and oriented to person, place, and time.   Skin: Skin is warm and intact.   Psychiatric: He has a normal mood and affect. His speech is normal and behavior is normal. Cognition and memory are normal.         ED Course   Procedures  Labs Reviewed   HIV 1 / 2 ANTIBODY   HEPATITIS C ANTIBODY          Imaging Results              US Lower Extremity Veins Left (Final result)  Result time 01/31/24 02:54:18      Final result by Asif Jackson MD (01/31/24 02:54:18)                   Impression:      No evidence of deep venous thrombosis in the left lower extremity.    Electronically signed by resident: Farrah Brooks  Date:    01/31/2024  Time:    02:43    Electronically signed by: Asif Jackson MD  Date:    01/31/2024  Time:    02:54               Narrative:    EXAMINATION:  US LOWER EXTREMITY VEINS LEFT    CLINICAL HISTORY:  Other specified soft tissue disorders.    TECHNIQUE:  Duplex and color flow Doppler evaluation and graded compression of the left lower extremity veins was performed.    COMPARISON:  None.    FINDINGS:  Left thigh veins: The common femoral, femoral, popliteal, and upper greater saphenous veins are patent and free of thrombus. The veins are normally compressible and have normal phasic flow and augmentation response.    Left calf veins: The visualized calf veins are patent.    Contralateral CFV: The contralateral (right) common femoral vein is patent and free of thrombus.    Miscellaneous: Mild soft tissue edema.  No organized fluid collection.                                       Medications   sulfamethoxazole-trimethoprim 800-160mg per tablet 1 tablet (has no administration in  time range)     Medical Decision Making  43-year-old male presenting with left lower extremity edema.   Differential:   DVT, cellulitis, lymphedema  Considered but doubt CHF, patient does not have a history of CHF.  This is unilateral edema.  He has not had any chest pain or shortness a breath.    Ultrasound negative for DVT  Patient without pain.  Considered but doubt CHF, doubt lymphedema will start the patient on Bactrim for treatment of cellulitis.  History of IV drug use, denies any recent IV drug use.  Did not inject into the leg.  Not concerned about cardiomyopathy.  Patient started on Bactrim and discharged home.    Risk  Prescription drug management.                                      Clinical Impression:  Final diagnoses:  [M79.89] Leg swelling  [L03.90] Cellulitis, unspecified cellulitis site (Primary)          ED Disposition Condition    Discharge Stable          ED Prescriptions       Medication Sig Dispense Start Date End Date Auth. Provider    sulfamethoxazole-trimethoprim 800-160mg (BACTRIM DS) 800-160 mg Tab Take 1 tablet by mouth 2 (two) times daily. for 10 days 20 tablet 1/31/2024 2/10/2024 Teja Cain PA-C          Follow-up Information    None          Teja Cain PA-C  01/31/24 9477

## 2024-01-31 NOTE — DISCHARGE INSTRUCTIONS

## 2024-02-15 ENCOUNTER — HOSPITAL ENCOUNTER (EMERGENCY)
Facility: HOSPITAL | Age: 44
Discharge: HOME | End: 2024-02-15
Attending: EMERGENCY MEDICINE
Payer: MEDICAID

## 2024-02-15 VITALS
RESPIRATION RATE: 18 BRPM | HEART RATE: 80 BPM | TEMPERATURE: 98 F | SYSTOLIC BLOOD PRESSURE: 112 MMHG | OXYGEN SATURATION: 99 % | DIASTOLIC BLOOD PRESSURE: 56 MMHG

## 2024-02-15 DIAGNOSIS — L03.90 CELLULITIS, UNSPECIFIED CELLULITIS SITE: Primary | ICD-10-CM

## 2024-02-15 PROCEDURE — 99283 EMERGENCY DEPT VISIT LOW MDM: CPT

## 2024-02-15 PROCEDURE — 25000003 PHARM REV CODE 250: Performed by: EMERGENCY MEDICINE

## 2024-02-15 RX ORDER — IBUPROFEN 600 MG/1
600 TABLET ORAL
Status: COMPLETED | OUTPATIENT
Start: 2024-02-15 | End: 2024-02-15

## 2024-02-15 RX ORDER — SULFAMETHOXAZOLE AND TRIMETHOPRIM 800; 160 MG/1; MG/1
1 TABLET ORAL 2 TIMES DAILY
Qty: 14 TABLET | Refills: 0 | Status: SHIPPED | OUTPATIENT
Start: 2024-02-15 | End: 2024-02-22

## 2024-02-15 RX ORDER — SULFAMETHOXAZOLE AND TRIMETHOPRIM 800; 160 MG/1; MG/1
1 TABLET ORAL
Status: COMPLETED | OUTPATIENT
Start: 2024-02-15 | End: 2024-02-15

## 2024-02-15 RX ADMIN — IBUPROFEN 600 MG: 600 TABLET, FILM COATED ORAL at 03:02

## 2024-02-15 RX ADMIN — SULFAMETHOXAZOLE AND TRIMETHOPRIM 1 TABLET: 800; 160 TABLET ORAL at 03:02

## 2024-02-15 NOTE — DISCHARGE INSTRUCTIONS
Diagnosis: Cellulitis     Cellulitis is a skin infection.     Tests today showed:   Labs Reviewed - No data to display  Imaging Results    None         Treatments you had today:   Medications   sulfamethoxazole-trimethoprim 800-160mg per tablet 1 tablet (has no administration in time range)   ibuprofen tablet 600 mg (has no administration in time range)       Home Care Instructions:  - Take the prescribed antibiotic as directed.   - Continue taking your home medications as prescribed    Follow-up plan:  · Follow-up with: Primary care doctor within 3 - 5  days  · Follow-up for additional testing and/or evaluation as directed by your primary doctor    Return to the emergency department if you develop significant pain or swelling at the site of your cellulitis infection, if you have fevers, nausea, vomiting, or diarrhea, or if the redness of your skin is moving beyond the area where it is red today. These could be signs of worsening infection requiring further medical care.     If your infection was outlined today, return to the emergency department if the redness extends beyond the area outlined by the pen.

## 2024-02-15 NOTE — ED TRIAGE NOTES
Chief Complaint   Patient presents with    Joint Swelling     Pt has swollen ankles x 1 month.  Pt seen on January 31st for same problem with no improvement since visit.       APPEARANCE: No acute distress.    NEURO: Awake, alert, appropriate for age  HEENT: Head symmetrical. No obvious deformity  RESPIRATORY: Airway is open and patent. Respirations are spontaneous on room air.   NEUROVASCULAR: All extremities are warm and pink with capillary refill less than 3 seconds.   MUSCULOSKELETAL: Moves all extremities, wiggling toes and moving hands.   SKIN: Warm and dry, adequate turgor, mucus membranes moist and pink    Will continue to monitor.

## 2024-02-15 NOTE — ED PROVIDER NOTES
Source of History:  Patient  Chart    Chief complaint:  Joint Swelling (Pt has swollen ankles x 1 month.  Pt seen on January 31st for same problem with no improvement since visit.  )      HPI:  Tye Florian III is a 43 y.o. male with history of polysubstance abuse presenting to emergency department with complaint of left ankle swelling.      Patient was seen in this emergency department on 01/31/2024 with complaint of atraumatic, painless left ankle swelling that had been present for 2-3 days.  He was thought to have a mild cellulitis.  He was given a dose of Bactrim here.  He had an ultrasound which was negative for DVT.  A prescription for Bactrim was provided, but patient states that he did not fill it because he does not have insurance at this time.  He was not aware that it is generic, and inexpensive.    He states that the swelling has been persistent.  It is not worsening.  He was not having fevers, numbness, or pain.  He has no other complaints at this time.  No chest pain, no shortness of breath.    Review of patient's allergies indicates:  No Known Allergies    No current facility-administered medications on file prior to encounter.     Current Outpatient Medications on File Prior to Encounter   Medication Sig Dispense Refill    acetaminophen (TYLENOL) 500 MG tablet Take 2 tablets (1,000 mg total) by mouth 3 (three) times daily as needed for Pain. 60 tablet 0    folic acid (FOLVITE) 1 MG tablet Take 1 tablet (1 mg total) by mouth once daily. 30 tablet 2    gabapentin (NEURONTIN) 600 MG tablet Take 1 tablet (600 mg total) by mouth 2 (two) times daily. 60 tablet 11    ibuprofen (ADVIL,MOTRIN) 600 MG tablet Take 1 tablet (600 mg total) by mouth every 6 (six) hours as needed for Pain. 20 tablet 0    multivitamin (ONCOVITE) tablet Take 1 tablet by mouth once daily. 30 tablet 2    nicotine (NICODERM CQ) 14 mg/24 hr Place 1 patch onto the skin once daily. 28 patch 2    ondansetron (ZOFRAN) 4 MG tablet  Take 1 tablet (4 mg total) by mouth every 6 (six) hours as needed for Nausea. 28 tablet 0    thiamine 100 MG tablet Take 1 tablet (100 mg total) by mouth once daily. 30 tablet 2       PMH:  As per HPI and below:  History reviewed. No pertinent past medical history.  History reviewed. No pertinent surgical history.    Social History     Socioeconomic History    Marital status: Single   Tobacco Use    Smoking status: Every Day     Types: Cigarettes    Smokeless tobacco: Never   Substance and Sexual Activity    Alcohol use: Yes    Drug use: Yes     Types: IV    Sexual activity: Yes     Social Determinants of Health     Financial Resource Strain: Medium Risk (2/15/2023)    Overall Financial Resource Strain (CARDIA)     Difficulty of Paying Living Expenses: Somewhat hard   Food Insecurity: Food Insecurity Present (2/15/2023)    Hunger Vital Sign     Worried About Running Out of Food in the Last Year: Sometimes true     Ran Out of Food in the Last Year: Sometimes true   Transportation Needs: No Transportation Needs (2/15/2023)    PRAPARE - Transportation     Lack of Transportation (Medical): No     Lack of Transportation (Non-Medical): No   Physical Activity: Inactive (2/15/2023)    Exercise Vital Sign     Days of Exercise per Week: 0 days     Minutes of Exercise per Session: 0 min   Stress: Stress Concern Present (2/15/2023)    [employer] - Occupational Stress Questionnaire     Feeling of Stress : To some extent   Social Connections: Socially Isolated (2/15/2023)    Social Connection and Isolation Panel [NHANES]     Frequency of Communication with Friends and Family: Never     Frequency of Social Gatherings with Friends and Family: Never     Attends Worship Services: Never     Active Member of Clubs or Organizations: No     Attends Club or Organization Meetings: Never     Marital Status: Never    Housing Stability: High Risk (2/15/2023)    Housing Stability Vital Sign     Unable to  Pay for Housing in the Last Year: Patient refused     Unstable Housing in the Last Year: Yes       History reviewed. No pertinent family history.    Physical Exam:      Vitals:    02/15/24 0338   BP: (!) 112/56   Pulse: 80   Resp: 18   Temp: 98 °F (36.7 °C)     Gen: No acute distress.  Nontoxic.  Well appearing.  Mental Status:  Alert and oriented .  Appropriate, conversant.  Skin: Warm, dry.  Minimal erythema overlying the distal left calf.  No significant warmth.  No crepitus.  Eyes: No conjunctival injection.  Pulm: No increased work of breathing.  No significant tachypnea.  No audible stridor or wheezing.  No conversational dyspnea.    CV: Regular rate. Regular rhythm palpable pulse in the left lower extremity.  It is warm and well perfused.  Pitting edema present.  MSK: Good range of motion all joints.  No deformities.    Neuro: Awake. Speech normal. No focal neuro deficit observed.      Laboratory Studies:  Labs Reviewed - No data to display    Chart reviewed.     Imaging Results    None         Medications Given:  Medications   sulfamethoxazole-trimethoprim 800-160mg per tablet 1 tablet (1 tablet Oral Given 2/15/24 0345)   ibuprofen tablet 600 mg (600 mg Oral Given 2/15/24 0342)       MDM:    43 y.o. male with complaint of ongoing minimal swelling and redness around the left ankle after being diagnosed with cellulitis but not filling his prescription.  He was afebrile and hemodynamically stable.  He was well-appearing.  No crepitus, appears to be a very mild cellulitis clinically.  He was already evaluated for a DVT with a negative ultrasound.      Will give 1st dose of antibiotic here.  He was provided with a prescription, as well as good Rx coupon.  He was now aware that the prescription is extremely expensive, which he should easily be able to fill.  Discharged home in stable condition.  Return precautions discussed at bedside.    Diagnostic Impression:    1. Cellulitis, unspecified cellulitis site          ED Disposition Condition    Discharge Stable          ED Prescriptions       Medication Sig Dispense Start Date End Date Auth. Provider    sulfamethoxazole-trimethoprim 800-160mg (BACTRIM DS) 800-160 mg Tab Take 1 tablet by mouth 2 (two) times daily. for 7 days 14 tablet 2/15/2024 2/22/2024 Dorothy Joy MD          Follow-up Information       Follow up With Specialties Details Why Contact Info    Your primary care doctor  Schedule an appointment as soon as possible for a visit               Patient understands the plan and is in agreement, verbalized understanding, questions answered    Dorothy Joy MD  Emergency Medicine         Dorothy Joy MD  02/15/24 8539

## 2024-09-11 ENCOUNTER — HOSPITAL ENCOUNTER (EMERGENCY)
Facility: HOSPITAL | Age: 44
Discharge: HOME | End: 2024-09-11
Attending: STUDENT IN AN ORGANIZED HEALTH CARE EDUCATION/TRAINING PROGRAM
Payer: MEDICAID

## 2024-09-11 VITALS
SYSTOLIC BLOOD PRESSURE: 96 MMHG | RESPIRATION RATE: 18 BRPM | TEMPERATURE: 98 F | HEIGHT: 71 IN | BODY MASS INDEX: 21 KG/M2 | DIASTOLIC BLOOD PRESSURE: 62 MMHG | WEIGHT: 150 LBS | HEART RATE: 56 BPM | OXYGEN SATURATION: 98 %

## 2024-09-11 DIAGNOSIS — M79.641 RIGHT HAND PAIN: Primary | ICD-10-CM

## 2024-09-11 PROCEDURE — 25000003 PHARM REV CODE 250: Performed by: PHYSICIAN ASSISTANT

## 2024-09-11 PROCEDURE — 99283 EMERGENCY DEPT VISIT LOW MDM: CPT | Mod: 25

## 2024-09-11 RX ORDER — IBUPROFEN 600 MG/1
600 TABLET ORAL
Status: COMPLETED | OUTPATIENT
Start: 2024-09-11 | End: 2024-09-11

## 2024-09-11 RX ORDER — ACETAMINOPHEN 500 MG
1000 TABLET ORAL
Status: COMPLETED | OUTPATIENT
Start: 2024-09-11 | End: 2024-09-11

## 2024-09-11 RX ADMIN — IBUPROFEN 600 MG: 600 TABLET, FILM COATED ORAL at 06:09

## 2024-09-11 RX ADMIN — ACETAMINOPHEN 1000 MG: 500 TABLET ORAL at 06:09
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
## 2024-09-11 NOTE — ED TRIAGE NOTES
Tye Paul Josecarmen III, a 43 y.o. male presents to the ED w/ complaint of right hand pain. Unable to bend pointer finger. Ring finger knuckle pain. Right hand looks swollen.     Triage note:  Chief Complaint   Patient presents with    Hand Pain     Right hand pain, with knuckle pain     Review of patient's allergies indicates:  No Known Allergies  No past medical history on file.

## 2024-09-11 NOTE — ED NOTES
Pt identifiers Tye Florian IIIchecked and correct  LOC: The patient is awake, alert, aware of environment with an appropriate affect. Oriented x3, speaking appropriately  APPEARANCE: Pt resting comfortably, in no acute distress, pt is clean and well groomed, clothing properly fastened  SKIN: Skin warm, dry and intact, normal skin turgor, moist mucus membranes  RESPIRATORY: Airway is open and patent, respirations are spontaneous, even and unlabored, normal effort and rate  CARDIAC: Normal rate and rhythm, no peripheral edema noted, capillary refill < 3 seconds, bilateral radial pulses 2+  ABDOMEN: Soft, nontender, nondistended. Bowel sounds present   NEUROLOGIC: PERRL, facial expression is symmetrical, patient moving all extremities spontaneously, normal sensation in all extremities when touched with a finger.  Follows all commands appropriately  MUSCULOSKELETAL: right hand decreased ROM, swelling.

## 2024-09-12 NOTE — DISCHARGE INSTRUCTIONS
Follow-up with your primary care provider for further evaluation.    Return to the emergency room for new, worsening, or concerning symptoms.      Yes-Patient/Caregiver accepts free interpretation services...